# Patient Record
Sex: FEMALE | Race: BLACK OR AFRICAN AMERICAN | NOT HISPANIC OR LATINO | Employment: FULL TIME | URBAN - METROPOLITAN AREA
[De-identification: names, ages, dates, MRNs, and addresses within clinical notes are randomized per-mention and may not be internally consistent; named-entity substitution may affect disease eponyms.]

---

## 2017-09-19 ENCOUNTER — TELEPHONE (OUTPATIENT)
Dept: TRANSPLANT | Facility: CLINIC | Age: 27
End: 2017-09-19

## 2017-09-19 NOTE — TELEPHONE ENCOUNTER
"Kelly Norton called and stated that she is interested in becoming a living donor for her aunt, Marshall Roberts.  Medical and social history obtained.  Patient reports that she is 5'4", 220#, BMI 37.8. Informed that she exceeds our weight criteria and testing can not begin until she reaches 190#. Patient agreed and verbalized understanding.   "

## 2017-10-24 ENCOUNTER — HOSPITAL ENCOUNTER (EMERGENCY)
Facility: HOSPITAL | Age: 27
Discharge: HOME | End: 2017-10-24
Attending: EMERGENCY MEDICINE
Payer: COMMERCIAL

## 2017-10-24 VITALS
OXYGEN SATURATION: 100 % | WEIGHT: 200 LBS | SYSTOLIC BLOOD PRESSURE: 150 MMHG | HEART RATE: 96 BPM | TEMPERATURE: 100 F | HEIGHT: 65 IN | RESPIRATION RATE: 16 BRPM | BODY MASS INDEX: 33.32 KG/M2 | DIASTOLIC BLOOD PRESSURE: 73 MMHG

## 2017-10-24 DIAGNOSIS — M79.10 MYALGIA: ICD-10-CM

## 2017-10-24 DIAGNOSIS — J06.9 UPPER RESPIRATORY TRACT INFECTION, UNSPECIFIED TYPE: Primary | ICD-10-CM

## 2017-10-24 LAB
B-HCG UR QL: NEGATIVE
CTP QC/QA: YES
FLUAV AG SPEC QL IA: NEGATIVE
FLUBV AG SPEC QL IA: NEGATIVE
SPECIMEN SOURCE: NORMAL

## 2017-10-24 PROCEDURE — 96372 THER/PROPH/DIAG INJ SC/IM: CPT

## 2017-10-24 PROCEDURE — 87400 INFLUENZA A/B EACH AG IA: CPT

## 2017-10-24 PROCEDURE — 63600175 PHARM REV CODE 636 W HCPCS: Performed by: EMERGENCY MEDICINE

## 2017-10-24 PROCEDURE — 81025 URINE PREGNANCY TEST: CPT | Performed by: EMERGENCY MEDICINE

## 2017-10-24 PROCEDURE — 99283 EMERGENCY DEPT VISIT LOW MDM: CPT | Mod: 25

## 2017-10-24 RX ORDER — BENZONATATE 100 MG/1
100 CAPSULE ORAL 3 TIMES DAILY PRN
Qty: 14 CAPSULE | Refills: 0 | Status: SHIPPED | OUTPATIENT
Start: 2017-10-24 | End: 2019-03-14

## 2017-10-24 RX ORDER — KETOROLAC TROMETHAMINE 30 MG/ML
30 INJECTION, SOLUTION INTRAMUSCULAR; INTRAVENOUS
Status: COMPLETED | OUTPATIENT
Start: 2017-10-24 | End: 2017-10-24

## 2017-10-24 RX ORDER — ONDANSETRON 4 MG/1
4 TABLET, FILM COATED ORAL EVERY 6 HOURS PRN
Qty: 10 TABLET | Refills: 0 | Status: SHIPPED | OUTPATIENT
Start: 2017-10-24 | End: 2018-04-04

## 2017-10-24 RX ADMIN — KETOROLAC TROMETHAMINE 30 MG: 30 INJECTION, SOLUTION INTRAMUSCULAR at 07:10

## 2017-10-24 NOTE — ED PROVIDER NOTES
Encounter Date: 10/24/2017    SCRIBE #1 NOTE: I, Lucas Silverman, am scribing for, and in the presence of,  Kevin Vinson MD. I have scribed the entire note.       History     Chief Complaint   Patient presents with    Generalized Body Aches     body aches, cough with green sputum, and subjective fever since this morning     Time patient was seen by the provider: 6:44 PM    The patient is a 26 y.o. female with hx of: C- section that presents to the ED with a complaint of cough and sore throat. She reports these symptoms started this AM and have been progressively worse through the day.Denies fever/chills, N/V/D, shortness of breath or dysuria.  Patient reports persistent cough that is nonproductive.  No chest pain reported.  Denies headache, lightheadedness, dizziness, vision changes, abdominal pain, constipation, diarrhea, dysuria, hematuria.              Review of patient's allergies indicates:  No Known Allergies  History reviewed. No pertinent past medical history.  Past Surgical History:   Procedure Laterality Date     SECTION       Family History   Problem Relation Age of Onset    No Known Problems Mother     No Known Problems Father      Social History   Substance Use Topics    Smoking status: Never Smoker    Smokeless tobacco: Not on file    Alcohol use No     Review of Systems   Constitutional: Negative for chills, fatigue and fever.   HENT: Positive for congestion and sore throat. Negative for voice change.    Eyes: Negative for photophobia, pain and redness.   Respiratory: Positive for cough. Negative for choking and shortness of breath.    Cardiovascular: Negative for chest pain, palpitations and leg swelling.   Gastrointestinal: Positive for nausea. Negative for abdominal pain and vomiting.   Genitourinary: Negative for dysuria, frequency and urgency.   Musculoskeletal: Positive for myalgias. Negative for back pain, neck pain and neck stiffness.   Skin: Negative for rash.   Neurological:  Negative for seizures, speech difficulty, weakness and numbness.   Hematological: Does not bruise/bleed easily.       Physical Exam     Initial Vitals [10/24/17 1833]   BP Pulse Resp Temp SpO2   (!) 150/73 96 16 99.5 °F (37.5 °C) 100 %      MAP       98.67         Physical Exam    Nursing note and vitals reviewed.  Constitutional: She appears well-developed.   HENT:   Head: Normocephalic and atraumatic.   eythematous posterior pharynx but no exudates or swelling; Dry MM   Eyes: Conjunctivae are normal.   Neck: Normal range of motion. Neck supple. No tracheal deviation present.   Cardiovascular: Normal rate, regular rhythm, normal heart sounds and intact distal pulses. Exam reveals no gallop and no friction rub.    No murmur heard.  Pulmonary/Chest: Breath sounds normal. She has no wheezes. She has no rhonchi. She has no rales.   Abdominal: Soft. She exhibits no distension. There is no tenderness.   Musculoskeletal: Normal range of motion. She exhibits no edema or tenderness.   Neurological: She is alert and oriented to person, place, and time. She has normal strength. No cranial nerve deficit or sensory deficit.   Skin: Skin is warm and dry. No rash noted. No erythema.   Psychiatric: She has a normal mood and affect.         ED Course   Procedures  Labs Reviewed   INFLUENZA A AND B ANTIGEN   POCT URINE PREGNANCY             Medical Decision Making:   Initial Assessment:   27 Y/O male presents to the ED with a complaint of sore throat, cough, and chills.   Differential Diagnosis:   URI, Influenza, Strep pharyngitis, PTA, viral syndrome, peritonsillar abscess, retropharyngeal abscess  ED Management:  7:26 PM Re-examination:  Pt is feeling improved after Toradol. Discussed results of influenza.     Pt was instructed to increase clear fluids including Gatorade G2 or Powerade. She was instructed to take 600mg ibuprofen with food for myalgias and provided prescriptions for Tessalon and zofran. Does not meet [facility]  criteria for antibiotics for strep pharyngitis.                    ED Course      Clinical Impression:     1. Upper respiratory tract infection, unspecified type    2. Myalgia          Disposition:   Disposition: Discharged  Condition: Stable       I, Dr. Kevin Vinson, personally performed the services described in this documentation. All medical record entries made by the scribe were at my direction and in my presence.  I have reviewed the chart and agree that the record reflects my personal performance and is accurate and complete. Kevin Vinson MD.  7:34 PM 10/24/2017                     Kevin Vinson MD  10/24/17 1939

## 2019-03-14 ENCOUNTER — HOSPITAL ENCOUNTER (EMERGENCY)
Facility: HOSPITAL | Age: 29
Discharge: HOME | End: 2019-03-14
Attending: EMERGENCY MEDICINE
Payer: MEDICAID

## 2019-03-14 VITALS
DIASTOLIC BLOOD PRESSURE: 60 MMHG | OXYGEN SATURATION: 100 % | TEMPERATURE: 98 F | SYSTOLIC BLOOD PRESSURE: 136 MMHG | BODY MASS INDEX: 42.34 KG/M2 | RESPIRATION RATE: 15 BRPM | HEART RATE: 88 BPM | HEIGHT: 64 IN | WEIGHT: 248 LBS

## 2019-03-14 DIAGNOSIS — M54.50 ACUTE LEFT-SIDED LOW BACK PAIN WITHOUT SCIATICA: ICD-10-CM

## 2019-03-14 DIAGNOSIS — R10.9 LEFT SIDED ABDOMINAL PAIN: Primary | ICD-10-CM

## 2019-03-14 LAB
ALBUMIN SERPL BCP-MCNC: 3.1 G/DL
ALP SERPL-CCNC: 92 U/L
ALT SERPL W/O P-5'-P-CCNC: 12 U/L
ANION GAP SERPL CALC-SCNC: 8 MMOL/L
AST SERPL-CCNC: 13 U/L
B-HCG UR QL: POSITIVE
BASOPHILS # BLD AUTO: 0.01 K/UL
BASOPHILS NFR BLD: 0.2 %
BILIRUB SERPL-MCNC: 0.2 MG/DL
BILIRUB UR QL STRIP: NEGATIVE
BUN SERPL-MCNC: 5 MG/DL
CALCIUM SERPL-MCNC: 9.3 MG/DL
CHLORIDE SERPL-SCNC: 107 MMOL/L
CLARITY UR: CLEAR
CO2 SERPL-SCNC: 22 MMOL/L
COLOR UR: YELLOW
CREAT SERPL-MCNC: 0.7 MG/DL
CTP QC/QA: YES
DIFFERENTIAL METHOD: ABNORMAL
EOSINOPHIL # BLD AUTO: 0.1 K/UL
EOSINOPHIL NFR BLD: 1.8 %
ERYTHROCYTE [DISTWIDTH] IN BLOOD BY AUTOMATED COUNT: 13 %
EST. GFR  (AFRICAN AMERICAN): >60 ML/MIN/1.73 M^2
EST. GFR  (NON AFRICAN AMERICAN): >60 ML/MIN/1.73 M^2
GLUCOSE SERPL-MCNC: 107 MG/DL
GLUCOSE UR QL STRIP: NEGATIVE
HCT VFR BLD AUTO: 36.2 %
HGB BLD-MCNC: 11.9 G/DL
HGB UR QL STRIP: NEGATIVE
KETONES UR QL STRIP: NEGATIVE
LEUKOCYTE ESTERASE UR QL STRIP: NEGATIVE
LIPASE SERPL-CCNC: 8 U/L
LYMPHOCYTES # BLD AUTO: 1.2 K/UL
LYMPHOCYTES NFR BLD: 24.6 %
MCH RBC QN AUTO: 27.5 PG
MCHC RBC AUTO-ENTMCNC: 32.9 G/DL
MCV RBC AUTO: 84 FL
MONOCYTES # BLD AUTO: 0.5 K/UL
MONOCYTES NFR BLD: 9.4 %
NEUTROPHILS # BLD AUTO: 3.2 K/UL
NEUTROPHILS NFR BLD: 63.8 %
NITRITE UR QL STRIP: NEGATIVE
PH UR STRIP: 7 [PH] (ref 5–8)
PLATELET # BLD AUTO: 182 K/UL
PMV BLD AUTO: 12.5 FL
POTASSIUM SERPL-SCNC: 3.8 MMOL/L
PROT SERPL-MCNC: 7.2 G/DL
PROT UR QL STRIP: NEGATIVE
RBC # BLD AUTO: 4.32 M/UL
SODIUM SERPL-SCNC: 137 MMOL/L
SP GR UR STRIP: 1.02 (ref 1–1.03)
URN SPEC COLLECT METH UR: NORMAL
UROBILINOGEN UR STRIP-ACNC: NEGATIVE EU/DL
WBC # BLD AUTO: 4.99 K/UL

## 2019-03-14 PROCEDURE — 25000003 PHARM REV CODE 250: Performed by: NURSE PRACTITIONER

## 2019-03-14 PROCEDURE — 80053 COMPREHEN METABOLIC PANEL: CPT

## 2019-03-14 PROCEDURE — 85025 COMPLETE CBC W/AUTO DIFF WBC: CPT

## 2019-03-14 PROCEDURE — 99284 EMERGENCY DEPT VISIT MOD MDM: CPT | Mod: 25

## 2019-03-14 PROCEDURE — 96360 HYDRATION IV INFUSION INIT: CPT

## 2019-03-14 PROCEDURE — 81025 URINE PREGNANCY TEST: CPT | Performed by: PHYSICIAN ASSISTANT

## 2019-03-14 PROCEDURE — 81003 URINALYSIS AUTO W/O SCOPE: CPT

## 2019-03-14 PROCEDURE — 83690 ASSAY OF LIPASE: CPT

## 2019-03-14 RX ORDER — ACETAMINOPHEN 325 MG/1
650 TABLET ORAL
Status: COMPLETED | OUTPATIENT
Start: 2019-03-14 | End: 2019-03-14

## 2019-03-14 RX ADMIN — SODIUM CHLORIDE 1000 ML: 0.9 INJECTION, SOLUTION INTRAVENOUS at 03:03

## 2019-03-14 RX ADMIN — ACETAMINOPHEN 650 MG: 325 TABLET ORAL at 05:03

## 2019-03-14 NOTE — ED NOTES
APPEARANCE: Alert, oriented and in no acute distress.  CARDIAC: Normal rate and rhythm, no murmur heard.   PERIPHERAL VASCULAR: peripheral pulses present. Normal cap refill. No edema. Warm to touch.    RESPIRATORY:Normal rate and effort, breath sounds clear bilaterally throughout chest. Respirations are equal and unlabored no obvious signs of distress.  GASTRO: soft, bowel sounds normal, no tenderness, no abdominal distention.  MUSC: Full ROM. No bony tenderness or soft tissue tenderness. No obvious deformity.  SKIN: Skin is warm and dry, normal skin turgor, mucous membranes moist.  NEURO: 5/5 strength major flexors/extensors bilaterally. Sensory intact to light touch bilaterally. Laurel coma scale: eyes open spontaneously-4, oriented & converses-5, obeys commands-6. No neurological abnormalities.   MENTAL STATUS: awake, alert and aware of environment.  EYE: PERRL, both eyes: pupils brisk and reactive to light. Normal size.  ENT: EARS: no obvious drainage. NOSE: no active bleeding.

## 2019-03-14 NOTE — ED PROVIDER NOTES
Encounter Date: 3/14/2019       History     Chief Complaint   Patient presents with    Back Pain     c/o left lower back pain that radiates up to left abdomen today. Pt is 10 weeks pregnant. Sees Dr. Devine.      27yo female  currently 10 weeks pregnant presents to the ED for abdominal and back pain which started today.  The patient reports the pain starts in her left flank and goes to her left upper and lower quadrants.  Pain is worse with movement palpation. Pain is constant, moderate. She denies trauma, fever/chills, chest pain, shortness of breath, vomiting, diarrhea, constipation, dysuria, vaginal bleeding, and discharge. She took Tylenol for the pain which did mildly help but the pain came back after a few hours.  The patient is established with  and has had an US to establish IUP.  She reports this pregnancy is healthy.  She has been eating and drinking well. No other complaints at this time.       The history is provided by the patient and medical records.     Review of patient's allergies indicates:  No Known Allergies  Past Medical History:   Diagnosis Date    Asthma      Past Surgical History:   Procedure Laterality Date     SECTION       Family History   Problem Relation Age of Onset    No Known Problems Mother     No Known Problems Father      Social History     Tobacco Use    Smoking status: Former Smoker     Types: Cigarettes     Last attempt to quit: 2018     Years since quittin.5    Smokeless tobacco: Never Used   Substance Use Topics    Alcohol use: No    Drug use: No     Review of Systems   Constitutional: Negative for activity change, appetite change, chills, fatigue and fever.   HENT: Negative for congestion.    Respiratory: Negative for cough and shortness of breath.    Cardiovascular: Negative for chest pain.   Gastrointestinal: Positive for abdominal pain. Negative for diarrhea, nausea and vomiting.   Genitourinary: Positive for flank pain (left).  Negative for dysuria, hematuria, vaginal bleeding and vaginal discharge.   Musculoskeletal: Positive for back pain.   Skin: Negative for rash.   Neurological: Negative for weakness and headaches.   Psychiatric/Behavioral: Negative for confusion.   All other systems reviewed and are negative.      Physical Exam     Initial Vitals [03/14/19 1409]   BP Pulse Resp Temp SpO2   -- 92 18 98.8 °F (37.1 °C) 95 %      MAP       --         Physical Exam    Nursing note and vitals reviewed.  Constitutional: Vital signs are normal. She appears well-developed and well-nourished. She is Obese . She is active and cooperative. She is easily aroused.  Non-toxic appearance. She does not have a sickly appearance. She does not appear ill. No distress.   HENT:   Head: Normocephalic and atraumatic.   Eyes: Conjunctivae and EOM are normal.   Neck: Normal range of motion. Neck supple.   Cardiovascular: Normal rate, regular rhythm and normal heart sounds.   Pulmonary/Chest: Effort normal and breath sounds normal.   Abdominal: Soft. Normal appearance and bowel sounds are normal. There is tenderness in the left upper quadrant and left lower quadrant. There is no rigidity, no rebound, no guarding and no CVA tenderness.   Neurological: She is alert, oriented to person, place, and time and easily aroused. She has normal strength. GCS eye subscore is 4. GCS verbal subscore is 5. GCS motor subscore is 6.   Skin: Skin is warm, dry and intact. No bruising and no rash noted. No erythema.   Psychiatric: She has a normal mood and affect. Her speech is normal and behavior is normal. Judgment and thought content normal. Cognition and memory are normal.         ED Course   Procedures  Labs Reviewed   CBC W/ AUTO DIFFERENTIAL - Abnormal; Notable for the following components:       Result Value    Hemoglobin 11.9 (*)     Hematocrit 36.2 (*)     All other components within normal limits   COMPREHENSIVE METABOLIC PANEL - Abnormal; Notable for the following  components:    CO2 22 (*)     BUN, Bld 5 (*)     Albumin 3.1 (*)     All other components within normal limits   POCT URINE PREGNANCY - Abnormal; Notable for the following components:    POC Preg Test, Ur Positive (*)     All other components within normal limits   URINALYSIS, REFLEX TO URINE CULTURE    Narrative:     Preferred Collection Type->Urine, Clean Catch   LIPASE          Imaging Results          US OB Less Than 14 Wks with Transvaginal (xpd) (Final result)  Result time 03/14/19 15:58:07   Procedure changed from US Pelvis Comp with Transvag NON-OB (xpd     Final result by Mason Vargas MD (03/14/19 15:58:07)                 Impression:      Living intrauterine twin pregnancy, likely monochorionic/diamniotic, as above.    Twin A: 11 weeks and 0 days with an estimated due date of 10/03/2019.    Twin B: 10 weeks and 4 days with an estimated due date of 10/06/2019.      Electronically signed by: Mason Vargas MD  Date:    03/14/2019  Time:    15:58             Narrative:    EXAMINATION:  US OB LESS THAN 14 WKS WITH TRANSVAGINAL (XPD)    CLINICAL HISTORY:  left pelvic pain.  r/o torsion.;    TECHNIQUE:  Transabdominal sonography of the pelvis was performed, followed by transvaginal sonography to better evaluate the uterus and ovaries.    COMPARISON:  None.    FINDINGS:  Intrauterine gestation(s): Twins,  by a thin membrane.  Placenta appears posterior and shared.    Twin A:    Yolk sac: Not seen.    Crown-rump length (CRL): 4 cm    Cardiac activity: 161 BPM    Twin B:    Yolk sac: Not seen.    Crown-rump length (CRL): 3.7 cm    Cardiac activity: 165 BPM    Uterus: 12.6 x 6 x 7.6 cm    Right ovary: Normal.    Left ovary: Normal.    Miscellaneous: No Free Fluid.  No subchorionic hematoma seen.                                 Medical Decision Making:   Differential Diagnosis:   Dehydration, infection,GERD, gas pain, electrolyte derangement, pancreatitis, cholecystitis, ovarian torsion, ovarian  cyst    Clinical Tests:   Lab Tests: Ordered and Reviewed  Radiological Study: Ordered and Reviewed  ED Management:  Labs, iv fluids, US pelvis, PO tylenol   US reveals viable twin pregnancy.  Ovaries normal bilaterally.  Labs unremarkable. UA negative for infection. Pt denies vaginal bleeding.  Pt reports complete resolution of back and abd pain after IV fluids and tylenol.  She is tolerating po fluids without difficulty and is requesting discharge.  Encouraged increased fluid intake and use of otc tylenol as directed on the labeling.  Pt to follow up with OB within 2 days.  I feel the pt's abd pain is benign.  I reviewed strict return precautions. In addition, pt is to return to the ED if condition changes, progresses, or if there are any concerns.  Pt verbalized understanding, compliance, and agreement with the treatment plan.  Pt states that she feels comfortable with DC at this time.                 Attending Attestation:     Physician Attestation Statement for NP/PA:   I discussed this assessment and plan of this patient with the NP/PA, but I did not personally examine the patient. The face to face encounter was performed by the NP/PA.                  ED Course as of Mar 14 1445   Thu Mar 14, 2019   1417 Sort note: Kelly Norton nontoxic/afebrile 28 y.o.  presented to the ED with c/o left lower and right lower back pain that began today.  Patient denies any trauma. Patient is currently 10 weeks pregnant followed by Dr. Colon.  She denies any GI or  symptoms     Patient seen and medically screened by Physician assistant in Sort process due to ED crowding.  Appropriate tests and/or medications ordered.  Care transferred to an alternate provider when patient was placed in an Exam Room from the [facility] for physical exam, additional orders, and disposition. 2:17 PM. LC    [LC]      ED Course User Index  [LC] RAMBO Purdy     Clinical Impression:       ICD-10-CM ICD-9-CM   1. Left sided abdominal pain  R10.9 789.09   2. Acute left-sided low back pain without sciatica M54.5 724.2                                TANO Crawford  03/14/19 1724       TANO Crawford  03/14/19 1724       Natan Victor MD  03/14/19 1729

## 2019-03-14 NOTE — DISCHARGE INSTRUCTIONS
Increase your water intake. Return to the ED if your condition changes, progresses, or if you have any concerns.

## 2019-04-20 ENCOUNTER — HOSPITAL ENCOUNTER (EMERGENCY)
Facility: HOSPITAL | Age: 29
Discharge: HOME | End: 2019-04-21
Attending: EMERGENCY MEDICINE
Payer: MEDICAID

## 2019-04-20 DIAGNOSIS — R10.9 ABDOMINAL PAIN IN PREGNANCY: ICD-10-CM

## 2019-04-20 DIAGNOSIS — O26.899 ABDOMINAL PAIN IN PREGNANCY: ICD-10-CM

## 2019-04-20 LAB
ALBUMIN SERPL BCP-MCNC: 2.8 G/DL (ref 3.5–5.2)
ALP SERPL-CCNC: 93 U/L (ref 55–135)
ALT SERPL W/O P-5'-P-CCNC: 16 U/L (ref 10–44)
ANION GAP SERPL CALC-SCNC: 6 MMOL/L (ref 8–16)
AST SERPL-CCNC: 14 U/L (ref 10–40)
BASOPHILS # BLD AUTO: 0.02 K/UL (ref 0–0.2)
BASOPHILS NFR BLD: 0.3 % (ref 0–1.9)
BILIRUB SERPL-MCNC: 0.1 MG/DL (ref 0.1–1)
BILIRUB UR QL STRIP: NEGATIVE
BUN SERPL-MCNC: 5 MG/DL (ref 6–20)
CALCIUM SERPL-MCNC: 8.6 MG/DL (ref 8.7–10.5)
CHLORIDE SERPL-SCNC: 108 MMOL/L (ref 95–110)
CLARITY UR: CLEAR
CO2 SERPL-SCNC: 21 MMOL/L (ref 23–29)
COLOR UR: YELLOW
CREAT SERPL-MCNC: 0.7 MG/DL (ref 0.5–1.4)
DIFFERENTIAL METHOD: ABNORMAL
EOSINOPHIL # BLD AUTO: 0.2 K/UL (ref 0–0.5)
EOSINOPHIL NFR BLD: 2.4 % (ref 0–8)
ERYTHROCYTE [DISTWIDTH] IN BLOOD BY AUTOMATED COUNT: 13.6 % (ref 11.5–14.5)
EST. GFR  (AFRICAN AMERICAN): >60 ML/MIN/1.73 M^2
EST. GFR  (NON AFRICAN AMERICAN): >60 ML/MIN/1.73 M^2
GLUCOSE SERPL-MCNC: 112 MG/DL (ref 70–110)
GLUCOSE UR QL STRIP: NEGATIVE
HCG INTACT+B SERPL-ACNC: NORMAL MIU/ML
HCT VFR BLD AUTO: 34 % (ref 37–48.5)
HGB BLD-MCNC: 11.2 G/DL (ref 12–16)
HGB UR QL STRIP: NEGATIVE
KETONES UR QL STRIP: NEGATIVE
LEUKOCYTE ESTERASE UR QL STRIP: NEGATIVE
LIPASE SERPL-CCNC: 11 U/L (ref 4–60)
LYMPHOCYTES # BLD AUTO: 1.4 K/UL (ref 1–4.8)
LYMPHOCYTES NFR BLD: 21.9 % (ref 18–48)
MCH RBC QN AUTO: 27.9 PG (ref 27–31)
MCHC RBC AUTO-ENTMCNC: 32.9 G/DL (ref 32–36)
MCV RBC AUTO: 85 FL (ref 82–98)
MONOCYTES # BLD AUTO: 0.5 K/UL (ref 0.3–1)
MONOCYTES NFR BLD: 8.4 % (ref 4–15)
NEUTROPHILS # BLD AUTO: 4.1 K/UL (ref 1.8–7.7)
NEUTROPHILS NFR BLD: 66.7 % (ref 38–73)
NITRITE UR QL STRIP: NEGATIVE
PH UR STRIP: 7 [PH] (ref 5–8)
PLATELET # BLD AUTO: 164 K/UL (ref 150–350)
PMV BLD AUTO: 12.5 FL (ref 9.2–12.9)
POTASSIUM SERPL-SCNC: 3.5 MMOL/L (ref 3.5–5.1)
PROT SERPL-MCNC: 6.6 G/DL (ref 6–8.4)
PROT UR QL STRIP: NEGATIVE
RBC # BLD AUTO: 4.01 M/UL (ref 4–5.4)
SODIUM SERPL-SCNC: 135 MMOL/L (ref 136–145)
SP GR UR STRIP: 1.01 (ref 1–1.03)
URN SPEC COLLECT METH UR: NORMAL
UROBILINOGEN UR STRIP-ACNC: NEGATIVE EU/DL
WBC # BLD AUTO: 6.16 K/UL (ref 3.9–12.7)

## 2019-04-20 PROCEDURE — 25000003 PHARM REV CODE 250: Performed by: EMERGENCY MEDICINE

## 2019-04-20 PROCEDURE — 84702 CHORIONIC GONADOTROPIN TEST: CPT

## 2019-04-20 PROCEDURE — 99284 EMERGENCY DEPT VISIT MOD MDM: CPT | Mod: 25

## 2019-04-20 PROCEDURE — 83690 ASSAY OF LIPASE: CPT

## 2019-04-20 PROCEDURE — 80053 COMPREHEN METABOLIC PANEL: CPT

## 2019-04-20 PROCEDURE — 81003 URINALYSIS AUTO W/O SCOPE: CPT

## 2019-04-20 PROCEDURE — 85025 COMPLETE CBC W/AUTO DIFF WBC: CPT

## 2019-04-20 RX ORDER — ACETAMINOPHEN 500 MG
1000 TABLET ORAL
Status: COMPLETED | OUTPATIENT
Start: 2019-04-20 | End: 2019-04-20

## 2019-04-20 RX ADMIN — ACETAMINOPHEN 1000 MG: 500 TABLET ORAL at 09:04

## 2019-04-21 VITALS
WEIGHT: 249 LBS | BODY MASS INDEX: 42.51 KG/M2 | HEIGHT: 64 IN | RESPIRATION RATE: 14 BRPM | DIASTOLIC BLOOD PRESSURE: 60 MMHG | SYSTOLIC BLOOD PRESSURE: 125 MMHG | OXYGEN SATURATION: 97 % | TEMPERATURE: 99 F | HEART RATE: 82 BPM

## 2019-04-21 RX ORDER — ACETAMINOPHEN 500 MG
500 TABLET ORAL EVERY 6 HOURS PRN
Qty: 28 TABLET | Refills: 0 | Status: SHIPPED | OUTPATIENT
Start: 2019-04-21

## 2019-04-21 NOTE — ED NOTES
Patient states she is a patient of Dr. Devine. This is her third pregnancy she has two living children at home. States she is 15 weeks with twins. Denies vaginal bleeding or discharge at this time. Patient only complains of pressure and pain in her lower abdomen at this time that got worse throughout the day today.

## 2019-04-21 NOTE — ED PROVIDER NOTES
Encounter Date: 2019    SCRIBE #1 NOTE: I, Sahil Grant, am scribing for, and in the presence of,  Dr. Lynn. I have scribed the entire note.       History     Chief Complaint   Patient presents with    Abdominal Pain     PT C/O LOWER ABDOMINAL/PELVIC PAIN AND PRESSURE ONSET THIS MORNING. PT REPORTS SHE IS 15 WEEKS PREGNANT. DENIES VAGINAL BLEEDING AND DISCHARGE.     Kelly Norton is a 28 y.o. female who  has a past medical history of Asthma.    The patient presents to the ED due to Abdominal Pain.   Patient reports that starting this morning she has had intermittent pain and pressure to the lower abdomen. Pt is 15 weeks pregnant . She notes that she has had similar episodes in the past, but not of this intensity. She reports that she is overall healthy and denies any dysuria, hematuria, vaginal discharge, CP, SOB, or N/V/D. She denies the daily use of any medications. SHx of 2 C-Sections. Pt notes her last menstrual was 19. She sees Dr. Devine. She is currently carrying twins. She denies complications thus far with the pregnancy. She works as a  and noted her pain was worse while walking.         The history is provided by the patient.     Review of patient's allergies indicates:  No Known Allergies  Past Medical History:   Diagnosis Date    Asthma      Past Surgical History:   Procedure Laterality Date     SECTION       Family History   Problem Relation Age of Onset    No Known Problems Mother     No Known Problems Father      Social History     Tobacco Use    Smoking status: Former Smoker     Types: Cigarettes     Last attempt to quit: 2018     Years since quittin.6    Smokeless tobacco: Never Used   Substance Use Topics    Alcohol use: No    Drug use: No     Review of Systems   Constitutional: Negative for chills and fever.   HENT: Negative for sore throat.    Respiratory: Negative for cough and shortness of breath.    Cardiovascular: Negative for chest  pain.   Gastrointestinal: Positive for abdominal pain. Negative for nausea and vomiting.   Genitourinary: Negative for dysuria, frequency and urgency.   Musculoskeletal: Negative for back pain, neck pain and neck stiffness.   Skin: Negative for rash and wound.   Neurological: Negative for syncope and weakness.   Hematological: Does not bruise/bleed easily.   Psychiatric/Behavioral: Negative for agitation, behavioral problems and confusion.       Physical Exam     Initial Vitals [04/20/19 2130]   BP Pulse Resp Temp SpO2   (!) 130/56 78 18 98.4 °F (36.9 °C) 99 %      MAP       --         Physical Exam    Nursing note and vitals reviewed.  Constitutional: She appears well-developed and well-nourished. She is not diaphoretic. No distress.   HENT:   Head: Normocephalic and atraumatic.   Mouth/Throat: Oropharynx is clear and moist.   Eyes: EOM are normal. Pupils are equal, round, and reactive to light.   Neck: No tracheal deviation present.   Cardiovascular: Normal rate, regular rhythm, normal heart sounds and intact distal pulses.   Pulmonary/Chest: Breath sounds normal. No stridor. No respiratory distress. She has no wheezes.   Abdominal: Soft. Bowel sounds are normal. She exhibits no distension and no mass. There is tenderness. There is no rebound, no guarding and negative Ramirez's sign.   Diffuse lower abdominal tenderness   Musculoskeletal: Normal range of motion. She exhibits no edema.   Neurological: She is alert and oriented to person, place, and time. No cranial nerve deficit or sensory deficit.   Skin: Skin is warm and dry. Capillary refill takes less than 2 seconds. No rash noted.         ED Course   Procedures  Labs Reviewed   COMPREHENSIVE METABOLIC PANEL - Abnormal; Notable for the following components:       Result Value    Sodium 135 (*)     CO2 21 (*)     Glucose 112 (*)     BUN, Bld 5 (*)     Calcium 8.6 (*)     Albumin 2.8 (*)     Anion Gap 6 (*)     All other components within normal limits   CBC W/  AUTO DIFFERENTIAL - Abnormal; Notable for the following components:    Hemoglobin 11.2 (*)     Hematocrit 34.0 (*)     All other components within normal limits   LIPASE   HCG, QUANTITATIVE, PREGNANCY   URINALYSIS, REFLEX TO URINE CULTURE    Narrative:     Preferred Collection Type->Urine, Clean Catch          Imaging Results          US OB Limited 1 Or More Gestations (Final result)  Result time 04/20/19 23:47:10    Final result by Lexii Zaldivar MD (04/20/19 23:47:10)                 Impression:      Live intrauterine diamniotic diamniotic twin gestation.  Twin A in cephalic presentation with a composite gestational age by ultrasound of 16 weeks and an estimated due date of 10/05/2019.  Estimated fetal weight of 130 g or 5 ounces.  Adequate amniotic fluid.    Twin B in cephalic presentation with a composite gestational age by ultrasound of 16 weeks 1 day and an estimated due date of 10/04/2019.  Estimated fetal weight of 135 g +/-20 g.  Adequate amniotic fluid.      Electronically signed by: Lexii Zaldivar  Date:    04/20/2019  Time:    23:47             Narrative:    EXAMINATION:  ULTRASOUND OB LIMITED ONE OR MORE GESTATIONS    CLINICAL HISTORY:  Abdominal Pain and pregnancy.    TECHNIQUE:  Real-time ultrasound OB limited was performed.    COMPARISON:  03/14/2019    FINDINGS:  There is a live diamniotic diamniotic twin intrauterine gestation in cephalic  presentation. Placenta is posterior.  There is no placenta previa or placental abruption. The cervix measures 4.3 cm in length.    Fetus A in cephalic presentation.    Fetus A MEASUREMENTS:    Fetal heart rate: 144 beats per minute.    Biparietal diameter: 3.4 cm. Sixteen weeks 4 days.    Head circumference: 11.7 cm. Fifteen weeks 6 days.    Abdominal circumference: 9.8 cm. Sixteen weeks.    Femoral length: 1.8 cm. Fifteen weeks 3 days.    Composite age by ultrasound: 16 week.    Estimated fetal weight: 130+/- 19 grams or 5 ounces +/-1 ounce(s).    Amniotic  fluid: Appears normal.  Main vertical pocket of 4.8 cm.    LMP percentile: Normal at 44%    Fetus B in cephalic presentation.    Fetus B MEASUREMENTS:    Fetal heart rate: 143 beats per minute.    Biparietal diameter: 3.4 cm. Sixteen weeks 4 days.    Head circumference: 12.4 cm. Sixteen weeks 2 days.    Abdominal circumference: 10.1 cm. Sixteen weeks 1 day.    Femoral length: 1.8 cm. Fifteen weeks 3 days.    Composite age by ultrasound: 16 week 1 day.    Estimated fetal weight: 135+/- 20 grams or 5 ounces +/-1 ounce(s).    Amniotic fluid: Appears normal.Amniotic fluid: Appears normal. Main vertical pocket of 4.3 cm.    LMP percentile: Normal at 56%                                 Medical Decision Making:   Differential Diagnosis:   Differential Diagnosis includes, but is not limited to:  AAA, aortic dissection, mesenteric ischemia, perforated viscous, MI/ACS, SBO/volvulus, incarcerated/strangulated hernia, intussusception, ileus, appendicitis, cholecystitis, cholangitis, diverticulitis, esophagitis, hepatitis, nephrolithiasis, pancreatitis, gastroenteritis, colitis, IBD/IBS, biliary colic, GERD, PUD, constipation, UTI/pyelonephritis,  disorder, vaginal bleeding, miscarriage     ED Management:  Labs US    29 yo female with intermittent lower abdominal pain that was worse today. Her labs are within normal limits. TVUS shows  +FHR. She was observed and given acetaminophen with improvement of her pain. Patient advised to follow up with OB GYN in 2-4 days.    Return precautions for worsening abdominal pain, fever, vomiting vaginal bleeding or any other concern.     After complete evaluation, including thorough history and physical exam, the patient's symptoms are most consistent with benign cause of abdominal pain. There is no rebound/guarding or other peritoneal signs to suggest perforation or other emergent surgical process. There is no fever or leukocytosis to suggest acute bacterial infection. There is no  significant focal abdominal tenderness to suggest cholecystitis, appendicitis, diverticulitis, or  source, and the patient's current symptoms and clinical presentation do not warrant other targeted diagnostics at this time.Advanced imaging  unlikely to outweigh risks of contrast/radiation at this time. The patient was treated with supportive care  and improved. Will provide RX for acetaminphen upon D/C.                        Clinical Impression:     1. Abdominal pain in pregnancy      Disposition:   Disposition: Discharged  Condition: Stable    Scribe Attestation I, Dru Lynn,  personally performed the services described in this documentation. All medical record entries made by the scribe were at my direction and in my presence.  I have reviewed the chart and agree that the record reflects my personal performance and is accurate and complete. Dru Lynn M.D. 2:45 AM04/21/2019                      Dru Lynn Jr., MD  04/21/19 0245

## 2019-04-21 NOTE — ED NOTES
Patient updated on ultrasound and time it will take to receive results at this time. Patient verbalized understanding.

## 2019-07-20 ENCOUNTER — HOSPITAL ENCOUNTER (OUTPATIENT)
Facility: HOSPITAL | Age: 29
Discharge: HOME | End: 2019-07-20
Attending: OBSTETRICS & GYNECOLOGY | Admitting: OBSTETRICS & GYNECOLOGY
Payer: MEDICAID

## 2019-07-20 VITALS
WEIGHT: 260 LBS | SYSTOLIC BLOOD PRESSURE: 145 MMHG | DIASTOLIC BLOOD PRESSURE: 69 MMHG | HEART RATE: 83 BPM | HEIGHT: 65 IN | OXYGEN SATURATION: 100 % | BODY MASS INDEX: 43.32 KG/M2 | TEMPERATURE: 99 F | RESPIRATION RATE: 16 BRPM

## 2019-07-20 PROCEDURE — 59025 FETAL NON-STRESS TEST: CPT

## 2019-07-20 PROCEDURE — 99211 OFF/OP EST MAY X REQ PHY/QHP: CPT | Mod: 25

## 2019-07-21 NOTE — NURSING
"Pt is a 29 y/o , with twins, at 28.4 weeks. Hx c/s x 2. Med hx asthma, states she has not had an attack for years. C/o ctxs started last week. States she had ctxs before she went to work at 6am, had no ctxs at work, ctxs started again after work. Unable to say how often the ctxs are, but the last one was  " in the car". Placed on monitors, no ctxs  noted or reported. Denies rom, vag bleed. + movement. States she had 2 bottles of water today. Given a pitcher of water for po hydration. Pt also states her dr told her she is " on the verge of diabetic".    cervix closed per sve.  "

## 2019-07-21 NOTE — NURSING
Dr perdomo paged again. Notified of pt arrival and status, results of assessment. Will d/c home on ptl prec and f/u with reg appt. Pt given printed and verbal instructions

## 2019-07-21 NOTE — DISCHARGE INSTRUCTIONS
ONE GLASS OF WATER EVERY 2 HOURS WHILE AWAKE. TYLENOL FOR PAIN. Continue meds as before. Follow up with all reg appointments. Return for vaginal bleeding, contractions every 5-7 minutes for an hour, waterbag breaking or babies not moving in 12 hours. Call for any questions

## 2019-08-11 ENCOUNTER — HOSPITAL ENCOUNTER (EMERGENCY)
Facility: HOSPITAL | Age: 29
Discharge: HOME | End: 2019-08-11
Attending: EMERGENCY MEDICINE
Payer: MEDICAID

## 2019-08-11 ENCOUNTER — HOSPITAL ENCOUNTER (OUTPATIENT)
Facility: HOSPITAL | Age: 29
Discharge: HOME | End: 2019-08-11
Attending: OBSTETRICS & GYNECOLOGY | Admitting: OBSTETRICS & GYNECOLOGY
Payer: MEDICAID

## 2019-08-11 VITALS
HEIGHT: 65 IN | TEMPERATURE: 98 F | OXYGEN SATURATION: 99 % | HEART RATE: 95 BPM | SYSTOLIC BLOOD PRESSURE: 100 MMHG | BODY MASS INDEX: 43.32 KG/M2 | WEIGHT: 260 LBS | DIASTOLIC BLOOD PRESSURE: 69 MMHG | RESPIRATION RATE: 20 BRPM

## 2019-08-11 VITALS
SYSTOLIC BLOOD PRESSURE: 133 MMHG | HEART RATE: 105 BPM | DIASTOLIC BLOOD PRESSURE: 63 MMHG | TEMPERATURE: 98 F | RESPIRATION RATE: 18 BRPM | OXYGEN SATURATION: 99 %

## 2019-08-11 DIAGNOSIS — R10.9 ABDOMINAL PAIN AFFECTING PREGNANCY: ICD-10-CM

## 2019-08-11 DIAGNOSIS — O26.899 ABDOMINAL PAIN AFFECTING PREGNANCY: ICD-10-CM

## 2019-08-11 DIAGNOSIS — L02.412 ABSCESS OF AXILLA, LEFT: Primary | ICD-10-CM

## 2019-08-11 DIAGNOSIS — L02.31 ABSCESS OF BUTTOCK: ICD-10-CM

## 2019-08-11 PROCEDURE — 10061 I&D ABSCESS COMP/MULTIPLE: CPT

## 2019-08-11 PROCEDURE — G0378 HOSPITAL OBSERVATION PER HR: HCPCS

## 2019-08-11 PROCEDURE — 25000003 PHARM REV CODE 250: Performed by: EMERGENCY MEDICINE

## 2019-08-11 PROCEDURE — 25000003 PHARM REV CODE 250: Performed by: NURSE PRACTITIONER

## 2019-08-11 PROCEDURE — 99211 OFF/OP EST MAY X REQ PHY/QHP: CPT

## 2019-08-11 PROCEDURE — 99284 EMERGENCY DEPT VISIT MOD MDM: CPT | Mod: 25,27

## 2019-08-11 RX ORDER — LIDOCAINE HYDROCHLORIDE 10 MG/ML
10 INJECTION INFILTRATION; PERINEURAL
Status: COMPLETED | OUTPATIENT
Start: 2019-08-11 | End: 2019-08-11

## 2019-08-11 RX ORDER — HYDROCODONE BITARTRATE AND ACETAMINOPHEN 5; 325 MG/1; MG/1
1 TABLET ORAL EVERY 6 HOURS PRN
Qty: 110 TABLET | Refills: 0 | Status: SHIPPED | OUTPATIENT
Start: 2019-08-11

## 2019-08-11 RX ORDER — CEPHALEXIN 500 MG/1
500 CAPSULE ORAL 4 TIMES DAILY
Qty: 28 CAPSULE | Refills: 0 | Status: SHIPPED | OUTPATIENT
Start: 2019-08-11 | End: 2019-08-18

## 2019-08-11 RX ORDER — ACETAMINOPHEN 500 MG
1000 TABLET ORAL
Status: COMPLETED | OUTPATIENT
Start: 2019-08-11 | End: 2019-08-11

## 2019-08-11 RX ADMIN — ACETAMINOPHEN 1000 MG: 500 TABLET ORAL at 08:08

## 2019-08-11 RX ADMIN — LIDOCAINE HYDROCHLORIDE 10 ML: 10 INJECTION, SOLUTION INFILTRATION; PERINEURAL at 07:08

## 2019-08-11 NOTE — PLAN OF CARE
pt arrived on unit from er via wheelchair, gowned and efm applied, pt complains of mild ctx but states the reason she is here is because she has two abcesses that need to be lanced.  One in her inner buttox and one under her left breast.  Pt denies vag bledding and leaking fluid, confirms +FM.

## 2019-08-11 NOTE — ED NOTES
Fetal heart tones accessed Child 1: 166                                               Child 2: 147

## 2019-08-11 NOTE — DISCHARGE INSTRUCTIONS
Follow Labor Precautions:  Call MD or return to Labor and Delivery if...    Labor (after 36 weeks)  - Painful contractions every 5 minutes for 2 hours that do not go away with 2 bottles of water, 2 tylenol, and rest.      Labor (before 36 weeks)  - More than 4 contractions in 1 hour   -First try 2 bottles of water, rest, and 2 tylenol.... If the contractions do not go away call doctors office or after hours clinic first and/or come to hospital for evaluation.      Water Breaks  - Gush or leaking of fluid from vagina, or if unsure.     Vaginal bleeding  - Bright red bleeding like a period, soaking a pad in 1 hour.    Decreased or No fetal movement  - You should feel 10 movements within two hours.  -If you are not feeling the baby move.... Drink a glass of orange juice or apple juice  - If you DO NOT feel 10 movements within two hours, please  call the MD or come to the hospital.    Unable to keep fluids or food down for more than 24 hours    Blurred vision, spots before your eyes, dizziness, headache that does not get better with Tylenol (Acetaminophen), bad swelling, chest pain, or trouble breathing.    Drink 10-12 glasses of water daily  Take medications as prescribed  Keep all follow-up appointments    LABOR     What is  labor?   labor is labor that occurs before 37 completed weeks of pregnancy.  Your baby could be born to early and have serious health problems.    Will you have  labor?   labor can happen to any woman, but some women are at greater risk than others.  A woman is more likely to have  labor if she:  · Is pregnant with twins or more  · Had a premature birth (before 37 completed weeks of pregnancy), in another pregnancy  · Has certain problems of the uterus or cervix    Call your health care provider or go to the hospital right away if you have any of these warning signs:  · Contractions that make your belly tighten up like a fist every 10 minutes or  more often  · Change in the color of your vaginal discharge, or bleeding from your vagina  · The feeling that your baby is pushing down.  This is called pelvic pressure  · Low, dull backache  · Cramps that feel like your period  · Belly cramps with or without diarrhea      Home Care:   It can help to drink plenty of water and take warm baths. Do what you can ahead of time to prepare for giving birth so youll have less to worry about later.   Keep a record of your contractions. Write down what time each one starts and how long it lasts. A stopwatch is helpful. Look for the pattern of regularly spaced out contractions with a gradual increase in the time each one lasts.   Dont be embarrassed about going to the hospital with a false alarm.     More about contractions    · When any muscle in your body contracts, it becomes tight or hard to the touch.  Your uterus is a muscle.  When it contracts, you will feel it tighten like a fist.  A contraction doesn't have to be painful.  It may feel like cramping or lower back pain.  When the contraction stops, your uterus becomes soft again.  · It's normal for your uterus to contract at times during pregnancy.  This may happen when you first lie down, after sex, or after you walk up and down stairs.  · It is not normal to have regular, frequent contractions before your baby is due.  If you feel a contraction every 10 minutes or more often during 1 hour (more than five contractions in an hour), then your uterus is elias too much.  Call your health care provider if this happens.                                GOING THE FULL 40 WEEKS      More and more births are being scheduled a little early for non-medical reasons.  Experts are learning that this can cause problems for both mom and baby.  If your pregnancy is healthy, wait for labor to begin on its own.    If your pregnancy is healthy and you are planning to schedule your baby's birth, its best to stay pregnant for at  least 39 weeks.  Babies born too early may have more health problems at birth and later in life than babies born later.  Being pregnant 39 weeks gives your baby's body all the time it needs to grow.    Here's why your baby needs 39 weeks:    · Important organs, like his brain, lungs and liver, get all the time they need to develop  · He/she is less likely to have vision and hearing problems after birth  · He/she has time to gain more weight in the womb.  Babies born a healthy weight have an easier time staying warm than babies born too small  · He/she can suck and swallow and stay awake long to eat after he/she's born.  Babies born early sometimes can't do these things.         KICK COUNTS    Its normal to worry about your babys health. One way you can know your babys doing well is to record the babys movements once a day. This is called a kick count. You will usually feel your baby move by the 20th week of pregnancy. Remember to take your kick count records to all your appointments with your healthcare provider.     How to Count Kicks   Choose a time when the baby is active, such as after a meal.   Sit comfortably or lie on your side.   The first time the baby moves, write down the time.   Count each movement until the baby has moved 10 times. This can take from 20 minutes to 2 hours.   If the baby hasnt moved 4 times in 1 hour, pat your stomach to wake the baby up.   Write down the time you feel the babys 10th movement.   Try to do it at the same time each day.    When to Call Your Healthcare Provider     Call your healthcare provider right away if you notice any of the following:   Your baby moves fewer than 10 times in 4 hours while youre doing kick counts.   Your baby moves much less often than on the days before.   You have not felt your baby move all day.        TIME NUMBER OF KICKS

## 2019-08-11 NOTE — ED PROVIDER NOTES
"Encounter Date: 2019       History     Chief Complaint   Patient presents with    Abscess     Pt reports she has an abscess under left arm, in armpit area. Pt is 31 weeks pregnanty and reported having light contractions. She was sent to L&D and has been cleared.      28-year-old pregnant female presents ED for evaluation of abscess under left arm x4 days.  Patient was also complaining of "light" contractions, was sent to L&D for evaluation and discharged.  Denies any abdominal pain or vaginal bleeding.  Patient states that the abscess under her left arm has been reoccurring and this is the 3rd time that it has popped up.  Patient states that last two times she had an abscess there she had them drained. Patient also complain of abscess to her buttocks for the past few days.  No treatments tried.  Denies any alleviating or exacerbating factors.  Denies fever.  Denies any other complaints at this time.    The history is provided by the patient.     Review of patient's allergies indicates:  No Known Allergies  Past Medical History:   Diagnosis Date    Asthma      Past Surgical History:   Procedure Laterality Date     SECTION       Family History   Problem Relation Age of Onset    No Known Problems Mother     No Known Problems Father      Social History     Tobacco Use    Smoking status: Former Smoker     Types: Cigarettes     Last attempt to quit: 2018     Years since quittin.9    Smokeless tobacco: Never Used   Substance Use Topics    Alcohol use: No    Drug use: No     Review of Systems   Constitutional: Negative for chills and fever.   Gastrointestinal: Negative for abdominal pain.   Genitourinary: Negative for vaginal discharge.   Skin: Positive for wound (abscess under left arm and buttocks).   Psychiatric/Behavioral: The patient is nervous/anxious.    All other systems reviewed and are negative.      Physical Exam     Initial Vitals [19 1746]   BP Pulse Resp Temp SpO2   122/73 " 110 20 98.2 °F (36.8 °C) 98 %      MAP       --         Physical Exam    Vitals reviewed.  Constitutional: She appears well-developed and well-nourished.  Non-toxic appearance. She does not have a sickly appearance.   HENT:   Head: Atraumatic.   Mouth/Throat: Oropharynx is clear and moist.   Eyes: EOM are normal.   Neck: Normal range of motion, full passive range of motion without pain and phonation normal. Neck supple.   Cardiovascular: Regular rhythm. Tachycardia present.    Pulmonary/Chest: No respiratory distress.   Abdominal:    abdomen.    Neurological: She is alert and oriented to person, place, and time.   Skin: Skin is warm. Abscess noted.   Approximately 6 cm x 6 cm area of fluctuance noted under left axilla.  Approximately 3 cm x 3 cm area of fluctuance noted to left buttocks.  No induration or drainage noted.     Psychiatric: Her mood appears anxious.         ED Course   I & D - Incision and Drainage  Date/Time: 2019 8:53 PM  Performed by: Hugh Sanchez NP  Authorized by: Altagracia Bashir MD   Consent Done: Not Needed  Type: abscess  Body area: upper extremity (left axilla)    Anesthesia:  Local Anesthetic: lidocaine 1% without epinephrine  Scalpel size: 11  Incision type: single straight  Complexity: simple  Drainage: pus and  bloody  Drainage amount: moderate  Wound treatment: wound packed  Packing material: 1/2 in gauze  Complications: No  Specimens: No  Implants: No  Patient tolerance: Patient tolerated the procedure well with no immediate complications    I & D - Incision and Drainage  Date/Time: 2019 8:54 PM  Performed by: Hugh Sanchez NP  Authorized by: Altagracia Bashir MD   Consent Done: Not Needed  Type: abscess  Body area: lower extremity  Location details: left buttock    Anesthesia:  Local Anesthetic: lidocaine 1% without epinephrine  Scalpel size: 11  Incision type: single straight  Complexity: simple  Drainage: pus and  bloody  Drainage amount: moderate  Wound  "treatment: deloculation and  wound packed  Packing material: 1/2 in gauze  Complications: No  Specimens: No  Implants: No  Patient tolerance: Patient tolerated the procedure well with no immediate complications        Labs Reviewed - No data to display       Imaging Results    None          Medical Decision Making:   History:   I obtained history from: someone other than patient.  Old Medical Records: I decided to obtain old medical records.  Old Records Summarized: records from clinic visits.       <> Summary of Records: 4/20/19- Patient had IUP confirming IUP on twins.   Initial Assessment:   28-year-old pregnant female presents ED for evaluation of abscess under left arm x4 days.  Patient was also complaining of "light" contractions, was sent to L&D for evaluation and discharged.  Denies any abdominal pain or vaginal bleeding.  Patient states that the abscess under her left arm has been reoccurring and this is the 3rd time that it has popped up.  Patient states that last two times she had an abscess there she had them drained. Patient also complain of abscess to her buttocks for the past few days.  No treatments tried.  Denies any alleviating or exacerbating factors.  Denies fever.  Denies any other complaints at this time.      ED Management:  I&D  No imaging needed at this time.  Patient was cleared by OB prior to arriving in ED. I&D performed, please see procedure notes.  Patient is hemodynamically stable will be discharged home with prescriptions for Norco and Keflex.  Narcotic precautions given.  Patient instructed to return to ED in 2-3 days for wound check and to watch for signs of infection including any worsening redness, pus, warmth, or fever and if she notices these things please follow up with your primary care physician immediately return to emergency department.  Instructed to take medications as prescribed and to refer to the additional materials provided for further information including when to " return to the emergency department.  Patient verbalized understanding, compliance, and agreement treatment plan.  Other:   I discussed test(s) with the performing physician.       <> Summary of the Findings: Care of this patient discussed with Dr. Bashir who agrees with ED course and disposition.                   ED Course as of Aug 11 2109   Sun Aug 11, 2019   2107 Attestation: Patient seen by me separately from the midlevel/resident. I agree with the history, physical and management of the patient.   The patient presents the emergency department 31 weeks pregnant with twins with a large left axillary abscess as well as an abscess to her left buttocks.  Both abscesses were I indeed by me, the patient tolerated the procedures well and she will be discharged with pain medications and Keflex.    [ST]      ED Course User Index  [ST] Altagracia Bashir MD     Clinical Impression:       ICD-10-CM ICD-9-CM   1. Abscess of axilla, left L02.412 682.3   2. Abscess of buttock L02.31 682.5                                Hugh Sanchez, JUANPABLO  08/11/19 4011

## 2019-08-12 NOTE — DISCHARGE INSTRUCTIONS
Keep the area clean and dry.  Follow-up primary care physician return here and 2-3 days for wound recheck.  Watch for signs of infection including any worsening redness, pus, warmth, or fever.  If you notice these things please follow up with your primary care physician immediately return to emergency department.  Take medications as prescribed.  Do not drive, drink alcohol, or operate machinery while taking Norco.  Refer to the additional materials provided for further information including when to return to the emergency department.

## 2019-08-12 NOTE — ED NOTES
APPEARANCE: Alert, oriented and in no acute distress.  CARDIAC: Normal rate and rhythm, no murmur heard.   PERIPHERAL VASCULAR: peripheral pulses present. Normal cap refill. No edema. Warm to touch.    RESPIRATORY:Normal rate and effort, breath sounds clear bilaterally throughout chest. Respirations are equal and unlabored no obvious signs of distress.  GASTRO: soft, bowel sounds normal, no tenderness, no abdominal distention.  MUSC: Full ROM. No bony tenderness or soft tissue tenderness. No obvious deformity.  SKIN: Skin is warm and dry, normal skin turgor, mucous membranes moist. Patient has an abscess about 5x6 cm under her left forearm and one 3x4 cm on left but cheek.   NEURO: 5/5 strength major flexors/extensors bilaterally. Sensory intact to light touch bilaterally. Whitefield coma scale: eyes open spontaneously-4, oriented & converses-5, obeys commands-6. No neurological abnormalities.   MENTAL STATUS: awake, alert and aware of environment.  EYE: PERRL, both eyes: pupils brisk and reactive to light. Normal size.  ENT: EARS: no obvious drainage. NOSE: no active bleeding.   celina.

## 2019-08-12 NOTE — PROVIDER PROGRESS NOTES - EMERGENCY DEPT.
Encounter Date: 8/11/2019    ED Physician Progress Notes             Prescription for Norco 5-325 mg was accidentally written for 110 tablets. [facility] pharmacy called to verify and I told them prescription was accidentally written wrong and to dispense 10 tablets of Norco.  To clarify patient will be given 10 tablets of Norco.

## 2019-08-29 ENCOUNTER — HOSPITAL ENCOUNTER (OUTPATIENT)
Facility: HOSPITAL | Age: 29
Discharge: HOME | End: 2019-08-29
Attending: OBSTETRICS & GYNECOLOGY | Admitting: OBSTETRICS & GYNECOLOGY
Payer: MEDICAID

## 2019-08-29 VITALS — RESPIRATION RATE: 18 BRPM | TEMPERATURE: 99 F

## 2019-08-29 DIAGNOSIS — O26.899 ABDOMINAL PAIN AFFECTING PREGNANCY: ICD-10-CM

## 2019-08-29 DIAGNOSIS — R10.9 ABDOMINAL PAIN AFFECTING PREGNANCY: ICD-10-CM

## 2019-08-29 PROCEDURE — 96360 HYDRATION IV INFUSION INIT: CPT

## 2019-08-29 PROCEDURE — 99211 OFF/OP EST MAY X REQ PHY/QHP: CPT | Mod: 25

## 2019-08-29 PROCEDURE — G0378 HOSPITAL OBSERVATION PER HR: HCPCS

## 2019-08-29 NOTE — DISCHARGE INSTRUCTIONS
Follow Labor Precautions:  Call MD or return to Labor and Delivery if...    Labor (after 36 weeks)  - Painful contractions every 5 minutes for 2 hours that do not go away with 2 bottles of water, 2 tylenol, and rest.      Labor (before 36 weeks)  - More than 4 contractions in 1 hour   -First try 2 bottles of water, rest, and 2 tylenol.... If the contractions do not go away call doctors office or after hours clinic first and/or come to hospital for evaluation.      Water Breaks  - Gush or leaking of fluid from vagina, or if unsure.     Vaginal bleeding  - Bright red bleeding like a period, soaking a pad in 1 hour.    Decreased or No fetal movement  - You should feel 10 movements within two hours.  -If you are not feeling the baby move.... Drink a glass of orange juice or apple juice  - If you DO NOT feel 10 movements within two hours, please  call the MD or come to the hospital.    Unable to keep fluids or food down for more than 24 hours    Blurred vision, spots before your eyes, dizziness, headache that does not get better with Tylenol (Acetaminophen), bad swelling, chest pain, or trouble breathing.    Drink 10-12 glasses of water daily  Take medications as prescribed  Keep all follow-up appointments    LABOR     What is  labor?   labor is labor that occurs before 37 completed weeks of pregnancy.  Your baby could be born to early and have serious health problems.    Will you have  labor?   labor can happen to any woman, but some women are at greater risk than others.  A woman is more likely to have  labor if she:  · Is pregnant with twins or more  · Had a premature birth (before 37 completed weeks of pregnancy), in another pregnancy  · Has certain problems of the uterus or cervix    Call your health care provider or go to the hospital right away if you have any of these warning signs:  · Contractions that make your belly tighten up like a fist every 10 minutes or  more often  · Change in the color of your vaginal discharge, or bleeding from your vagina  · The feeling that your baby is pushing down.  This is called pelvic pressure  · Low, dull backache  · Cramps that feel like your period  · Belly cramps with or without diarrhea      Home Care:   It can help to drink plenty of water and take warm baths. Do what you can ahead of time to prepare for giving birth so youll have less to worry about later.   Keep a record of your contractions. Write down what time each one starts and how long it lasts. A stopwatch is helpful. Look for the pattern of regularly spaced out contractions with a gradual increase in the time each one lasts.   Dont be embarrassed about going to the hospital with a false alarm.     More about contractions    · When any muscle in your body contracts, it becomes tight or hard to the touch.  Your uterus is a muscle.  When it contracts, you will feel it tighten like a fist.  A contraction doesn't have to be painful.  It may feel like cramping or lower back pain.  When the contraction stops, your uterus becomes soft again.  · It's normal for your uterus to contract at times during pregnancy.  This may happen when you first lie down, after sex, or after you walk up and down stairs.  · It is not normal to have regular, frequent contractions before your baby is due.  If you feel a contraction every 10 minutes or more often during 1 hour (more than five contractions in an hour), then your uterus is elias too much.  Call your health care provider if this happens.                                GOING THE FULL 40 WEEKS      More and more births are being scheduled a little early for non-medical reasons.  Experts are learning that this can cause problems for both mom and baby.  If your pregnancy is healthy, wait for labor to begin on its own.    If your pregnancy is healthy and you are planning to schedule your baby's birth, its best to stay pregnant for at  least 39 weeks.  Babies born too early may have more health problems at birth and later in life than babies born later.  Being pregnant 39 weeks gives your baby's body all the time it needs to grow.    Here's why your baby needs 39 weeks:    · Important organs, like his brain, lungs and liver, get all the time they need to develop  · He/she is less likely to have vision and hearing problems after birth  · He/she has time to gain more weight in the womb.  Babies born a healthy weight have an easier time staying warm than babies born too small  · He/she can suck and swallow and stay awake long to eat after he/she's born.  Babies born early sometimes can't do these things.                                                                 KICK COUNTS    Its normal to worry about your babys health. One way you can know your babys doing well is to record the babys movements once a day. This is called a kick count. You will usually feel your baby move by the 20th week of pregnancy. Remember to take your kick count records to all your appointments with your healthcare provider.     How to Count Kicks   Choose a time when the baby is active, such as after a meal.   Sit comfortably or lie on your side.   The first time the baby moves, write down the time.   Count each movement until the baby has moved 10 times. This can take from 20 minutes to 2 hours.   If the baby hasnt moved 4 times in 1 hour, pat your stomach to wake the baby up.   Write down the time you feel the babys 10th movement.   Try to do it at the same time each day.    When to Call Your Healthcare Provider     Call your healthcare provider right away if you notice any of the following:   Your baby moves fewer than 10 times in 4 hours while youre doing kick counts.   Your baby moves much less often than on the days before.   You have not felt your baby move all day.        TIME NUMBER OF KICKS                                                    LABOR     What is  labor?   labor is labor that occurs before 37 completed weeks of pregnancy.  Your baby could be born to early and have serious health problems.    Will you have  labor?   labor can happen to any woman, but some women are at greater risk than others.  A woman is more likely to have  labor if she:  · Is pregnant with twins or more  · Had a premature birth (before 37 completed weeks of pregnancy), in another pregnancy  · Has certain problems of the uterus or cervix    Call your health care provider or go to the hospital right away if you have any of these warning signs:  · Contractions that make your belly tighten up like a fist every 10 minutes or more often  · Change in the color of your vaginal discharge, or bleeding from your vagina  · The feeling that your baby is pushing down.  This is called pelvic pressure  · Low, dull backache  · Cramps that feel like your period  · Belly cramps with or without diarrhea      Home Care:   It can help to drink plenty of water and take warm baths. Do what you can ahead of time to prepare for giving birth so youll have less to worry about later.   Keep a record of your contractions. Write down what time each one starts and how long it lasts. A stopwatch is helpful. Look for the pattern of regularly spaced out contractions with a gradual increase in the time each one lasts.   Dont be embarrassed about going to the hospital with a false alarm.     More about contractions    · When any muscle in your body contracts, it becomes tight or hard to the touch.  Your uterus is a muscle.  When it contracts, you will feel it tighten like a fist.  A contraction doesn't have to be painful.  It may feel like cramping or lower back pain.  When the contraction stops, your uterus becomes soft again.  · It's normal for your uterus to contract at times during pregnancy.  This may happen when you first lie down, after sex, or after you walk up  and down stairs.  · It is not normal to have regular, frequent contractions before your baby is due.  If you feel a contraction every 10 minutes or more often during 1 hour (more than five contractions in an hour), then your uterus is elias too much.  Call your health care provider if this happens.                                GOING THE FULL 40 WEEKS      More and more births are being scheduled a little early for non-medical reasons.  Experts are learning that this can cause problems for both mom and baby.  If your pregnancy is healthy, wait for labor to begin on its own.    If your pregnancy is healthy and you are planning to schedule your baby's birth, its best to stay pregnant for at least 39 weeks.  Babies born too early may have more health problems at birth and later in life than babies born later.  Being pregnant 39 weeks gives your baby's body all the time it needs to grow.    Here's why your baby needs 39 weeks:    · Important organs, like his brain, lungs and liver, get all the time they need to develop  · He/she is less likely to have vision and hearing problems after birth  · He/she has time to gain more weight in the womb.  Babies born a healthy weight have an easier time staying warm than babies born too small  · He/she can suck and swallow and stay awake long to eat after he/she's born.  Babies born early sometimes can't do these things.           KICK COUNTS    Its normal to worry about your babys health. One way you can know your babys doing well is to record the babys movements once a day. This is called a kick count. You will usually feel your baby move by the 20th week of pregnancy. Remember to take your kick count records to all your appointments with your healthcare provider.     How to Count Kicks   Choose a time when the baby is active, such as after a meal.   Sit comfortably or lie on your side.   The first time the baby moves, write down the time.   Count each movement until  the baby has moved 10 times. This can take from 20 minutes to 2 hours.   If the baby hasnt moved 4 times in 1 hour, pat your stomach to wake the baby up.   Write down the time you feel the babys 10th movement.   Try to do it at the same time each day.    When to Call Your Healthcare Provider     Call your healthcare provider right away if you notice any of the following:   Your baby moves fewer than 10 times in 4 hours while youre doing kick counts.   Your baby moves much less often than on the days before.   You have not felt your baby move all day.        TIME NUMBER OF KICKS

## 2019-08-29 NOTE — PLAN OF CARE
pt arrived on unit from er via wheelchair, gowned and efm applied, pt complains of having ctx for a few hours, she denies vag bleeding and leaking fluid, states babies are not moving as much, head to toe assessment completed, explained plan of care.

## 2019-08-31 ENCOUNTER — ANESTHESIA EVENT (OUTPATIENT)
Dept: OBSTETRICS AND GYNECOLOGY | Facility: HOSPITAL | Age: 29
End: 2019-08-31
Payer: MEDICAID

## 2019-08-31 ENCOUNTER — ANESTHESIA (OUTPATIENT)
Dept: OBSTETRICS AND GYNECOLOGY | Facility: HOSPITAL | Age: 29
End: 2019-08-31
Payer: MEDICAID

## 2019-08-31 ENCOUNTER — ANESTHESIA EVENT (OUTPATIENT)
Dept: OBSTETRICS AND GYNECOLOGY | Facility: HOSPITAL | Age: 29
End: 2019-08-31

## 2019-08-31 ENCOUNTER — HOSPITAL ENCOUNTER (INPATIENT)
Facility: HOSPITAL | Age: 29
LOS: 4 days | Discharge: HOME | End: 2019-09-04
Attending: OBSTETRICS & GYNECOLOGY | Admitting: SPECIALIST
Payer: MEDICAID

## 2019-08-31 ENCOUNTER — ANESTHESIA (OUTPATIENT)
Dept: OBSTETRICS AND GYNECOLOGY | Facility: HOSPITAL | Age: 29
End: 2019-08-31

## 2019-08-31 DIAGNOSIS — O42.90 AMNIOTIC FLUID LEAKING: ICD-10-CM

## 2019-08-31 LAB
ABO + RH BLD: NORMAL
ABO + RH BLD: NORMAL
BASOPHILS # BLD AUTO: 0.01 K/UL (ref 0–0.2)
BASOPHILS NFR BLD: 0.1 % (ref 0–1.9)
BLD GP AB SCN CELLS X3 SERPL QL: NORMAL
BLD GP AB SCN CELLS X3 SERPL QL: NORMAL
BLOOD GROUP ANTIBODIES SERPL: NORMAL
DAT IGG-SP REAG RBC-IMP: NORMAL
DIFFERENTIAL METHOD: ABNORMAL
EOSINOPHIL # BLD AUTO: 0 K/UL (ref 0–0.5)
EOSINOPHIL NFR BLD: 0.4 % (ref 0–8)
ERYTHROCYTE [DISTWIDTH] IN BLOOD BY AUTOMATED COUNT: 13.6 % (ref 11.5–14.5)
FETAL CELL SCN BLD QL ROSETTE: NORMAL
HCT VFR BLD AUTO: 37.7 % (ref 37–48.5)
HGB BLD-MCNC: 12.1 G/DL (ref 12–16)
LYMPHOCYTES # BLD AUTO: 1.2 K/UL (ref 1–4.8)
LYMPHOCYTES NFR BLD: 14.9 % (ref 18–48)
MCH RBC QN AUTO: 27.7 PG (ref 27–31)
MCHC RBC AUTO-ENTMCNC: 32.1 G/DL (ref 32–36)
MCV RBC AUTO: 86 FL (ref 82–98)
MONOCYTES # BLD AUTO: 0.8 K/UL (ref 0.3–1)
MONOCYTES NFR BLD: 9.4 % (ref 4–15)
NEUTROPHILS # BLD AUTO: 5.9 K/UL (ref 1.8–7.7)
NEUTROPHILS NFR BLD: 75.2 % (ref 38–73)
PLATELET # BLD AUTO: 183 K/UL (ref 150–350)
PMV BLD AUTO: 12.3 FL (ref 9.2–12.9)
RBC # BLD AUTO: 4.37 M/UL (ref 4–5.4)
RPR SER QL: NORMAL
WBC # BLD AUTO: 7.94 K/UL (ref 3.9–12.7)

## 2019-08-31 PROCEDURE — 63600175 PHARM REV CODE 636 W HCPCS: Performed by: SPECIALIST

## 2019-08-31 PROCEDURE — 63600175 PHARM REV CODE 636 W HCPCS: Performed by: OBSTETRICS & GYNECOLOGY

## 2019-08-31 PROCEDURE — 86850 RBC ANTIBODY SCREEN: CPT | Mod: 91

## 2019-08-31 PROCEDURE — 25000003 PHARM REV CODE 250: Performed by: OBSTETRICS & GYNECOLOGY

## 2019-08-31 PROCEDURE — 88307 TISSUE EXAM BY PATHOLOGIST: CPT | Performed by: PATHOLOGY

## 2019-08-31 PROCEDURE — 86850 RBC ANTIBODY SCREEN: CPT

## 2019-08-31 PROCEDURE — 86592 SYPHILIS TEST NON-TREP QUAL: CPT

## 2019-08-31 PROCEDURE — 88307 TISSUE EXAM BY PATHOLOGIST: CPT | Mod: 26,,, | Performed by: PATHOLOGY

## 2019-08-31 PROCEDURE — 37000009 HC ANESTHESIA EA ADD 15 MINS: Performed by: OBSTETRICS & GYNECOLOGY

## 2019-08-31 PROCEDURE — 25000003 PHARM REV CODE 250: Performed by: SPECIALIST

## 2019-08-31 PROCEDURE — 27201121 HC TRAY,SPINAL-HYPERBARIC: Performed by: ANESTHESIOLOGY

## 2019-08-31 PROCEDURE — 88307 TISSUE SPECIMEN TO PATHOLOGY - SURGERY: ICD-10-PCS | Mod: 26,,, | Performed by: PATHOLOGY

## 2019-08-31 PROCEDURE — 63600175 PHARM REV CODE 636 W HCPCS: Performed by: ANESTHESIOLOGY

## 2019-08-31 PROCEDURE — 37000008 HC ANESTHESIA 1ST 15 MINUTES: Performed by: OBSTETRICS & GYNECOLOGY

## 2019-08-31 PROCEDURE — 99211 OFF/OP EST MAY X REQ PHY/QHP: CPT | Mod: 25

## 2019-08-31 PROCEDURE — 86870 RBC ANTIBODY IDENTIFICATION: CPT

## 2019-08-31 PROCEDURE — 63600175 PHARM REV CODE 636 W HCPCS: Performed by: STUDENT IN AN ORGANIZED HEALTH CARE EDUCATION/TRAINING PROGRAM

## 2019-08-31 PROCEDURE — 85461 HEMOGLOBIN FETAL: CPT

## 2019-08-31 PROCEDURE — 85025 COMPLETE CBC W/AUTO DIFF WBC: CPT

## 2019-08-31 PROCEDURE — 51702 INSERT TEMP BLADDER CATH: CPT

## 2019-08-31 PROCEDURE — 36000686 HC CESAREAN SECTION LEVEL II

## 2019-08-31 PROCEDURE — 86880 COOMBS TEST DIRECT: CPT

## 2019-08-31 PROCEDURE — 36415 COLL VENOUS BLD VENIPUNCTURE: CPT

## 2019-08-31 PROCEDURE — 25000003 PHARM REV CODE 250: Performed by: ANESTHESIOLOGY

## 2019-08-31 PROCEDURE — S0028 INJECTION, FAMOTIDINE, 20 MG: HCPCS | Performed by: ANESTHESIOLOGY

## 2019-08-31 PROCEDURE — 72100002 HC LABOR CARE, 1ST 8 HOURS

## 2019-08-31 PROCEDURE — 59025 FETAL NON-STRESS TEST: CPT

## 2019-08-31 PROCEDURE — 11000001 HC ACUTE MED/SURG PRIVATE ROOM

## 2019-08-31 RX ORDER — METHYLERGONOVINE MALEATE 0.2 MG/ML
INJECTION INTRAVENOUS
Status: DISCONTINUED | OUTPATIENT
Start: 2019-08-31 | End: 2019-08-31

## 2019-08-31 RX ORDER — DOCUSATE SODIUM 100 MG/1
200 CAPSULE, LIQUID FILLED ORAL 2 TIMES DAILY
Status: DISCONTINUED | OUTPATIENT
Start: 2019-08-31 | End: 2019-09-04 | Stop reason: HOSPADM

## 2019-08-31 RX ORDER — PHENYLEPHRINE HYDROCHLORIDE 10 MG/ML
INJECTION INTRAVENOUS
Status: DISCONTINUED | OUTPATIENT
Start: 2019-08-31 | End: 2019-08-31

## 2019-08-31 RX ORDER — MISOPROSTOL 200 UG/1
800 TABLET ORAL
Status: DISCONTINUED | OUTPATIENT
Start: 2019-08-31 | End: 2019-09-04 | Stop reason: HOSPADM

## 2019-08-31 RX ORDER — SODIUM CHLORIDE, SODIUM LACTATE, POTASSIUM CHLORIDE, CALCIUM CHLORIDE 600; 310; 30; 20 MG/100ML; MG/100ML; MG/100ML; MG/100ML
INJECTION, SOLUTION INTRAVENOUS CONTINUOUS
Status: ACTIVE | OUTPATIENT
Start: 2019-08-31 | End: 2019-08-31

## 2019-08-31 RX ORDER — SODIUM CHLORIDE, SODIUM LACTATE, POTASSIUM CHLORIDE, CALCIUM CHLORIDE 600; 310; 30; 20 MG/100ML; MG/100ML; MG/100ML; MG/100ML
INJECTION, SOLUTION INTRAVENOUS CONTINUOUS
Status: DISCONTINUED | OUTPATIENT
Start: 2019-08-31 | End: 2019-09-04 | Stop reason: HOSPADM

## 2019-08-31 RX ORDER — SODIUM CITRATE AND CITRIC ACID MONOHYDRATE 334; 500 MG/5ML; MG/5ML
30 SOLUTION ORAL
Status: DISCONTINUED | OUTPATIENT
Start: 2019-08-31 | End: 2019-09-04 | Stop reason: HOSPADM

## 2019-08-31 RX ORDER — OXYTOCIN/RINGER'S LACTATE 20/1000 ML
333 PLASTIC BAG, INJECTION (ML) INTRAVENOUS CONTINUOUS
Status: ACTIVE | OUTPATIENT
Start: 2019-08-31 | End: 2019-08-31

## 2019-08-31 RX ORDER — DIPHENHYDRAMINE HCL 25 MG
25 CAPSULE ORAL EVERY 4 HOURS PRN
Status: DISCONTINUED | OUTPATIENT
Start: 2019-08-31 | End: 2019-09-04 | Stop reason: HOSPADM

## 2019-08-31 RX ORDER — FAMOTIDINE 10 MG/ML
20 INJECTION INTRAVENOUS ONCE
Status: DISCONTINUED | OUTPATIENT
Start: 2019-08-31 | End: 2019-09-04 | Stop reason: HOSPADM

## 2019-08-31 RX ORDER — DIPHENHYDRAMINE HYDROCHLORIDE 50 MG/ML
12.5 INJECTION INTRAMUSCULAR; INTRAVENOUS NIGHTLY PRN
Status: DISCONTINUED | OUTPATIENT
Start: 2019-08-31 | End: 2019-09-04 | Stop reason: HOSPADM

## 2019-08-31 RX ORDER — OXYCODONE AND ACETAMINOPHEN 5; 325 MG/1; MG/1
1 TABLET ORAL EVERY 4 HOURS PRN
Status: DISCONTINUED | OUTPATIENT
Start: 2019-08-31 | End: 2019-09-04 | Stop reason: HOSPADM

## 2019-08-31 RX ORDER — ONDANSETRON 8 MG/1
8 TABLET, ORALLY DISINTEGRATING ORAL EVERY 8 HOURS PRN
Status: DISCONTINUED | OUTPATIENT
Start: 2019-08-31 | End: 2019-09-04 | Stop reason: HOSPADM

## 2019-08-31 RX ORDER — CARBOPROST TROMETHAMINE 250 UG/ML
INJECTION, SOLUTION INTRAMUSCULAR
Status: DISCONTINUED
Start: 2019-08-31 | End: 2019-08-31 | Stop reason: WASHOUT

## 2019-08-31 RX ORDER — ADHESIVE BANDAGE
30 BANDAGE TOPICAL 2 TIMES DAILY PRN
Status: DISCONTINUED | OUTPATIENT
Start: 2019-09-01 | End: 2019-09-04 | Stop reason: HOSPADM

## 2019-08-31 RX ORDER — NAPROXEN 500 MG/1
500 TABLET ORAL EVERY 8 HOURS PRN
Status: DISCONTINUED | OUTPATIENT
Start: 2019-08-31 | End: 2019-09-04 | Stop reason: HOSPADM

## 2019-08-31 RX ORDER — NALBUPHINE HYDROCHLORIDE 20 MG/ML
2.5 INJECTION, SOLUTION INTRAMUSCULAR; INTRAVENOUS; SUBCUTANEOUS
Status: DISCONTINUED | OUTPATIENT
Start: 2019-08-31 | End: 2019-09-04 | Stop reason: HOSPADM

## 2019-08-31 RX ORDER — DIPHENHYDRAMINE HYDROCHLORIDE 50 MG/ML
12.5 INJECTION INTRAMUSCULAR; INTRAVENOUS
Status: DISCONTINUED | OUTPATIENT
Start: 2019-08-31 | End: 2019-09-04 | Stop reason: HOSPADM

## 2019-08-31 RX ORDER — OXYTOCIN 10 [USP'U]/ML
INJECTION, SOLUTION INTRAMUSCULAR; INTRAVENOUS
Status: DISCONTINUED | OUTPATIENT
Start: 2019-08-31 | End: 2019-08-31

## 2019-08-31 RX ORDER — SODIUM CITRATE AND CITRIC ACID MONOHYDRATE 334; 500 MG/5ML; MG/5ML
30 SOLUTION ORAL ONCE
Status: DISCONTINUED | OUTPATIENT
Start: 2019-08-31 | End: 2019-09-04 | Stop reason: HOSPADM

## 2019-08-31 RX ORDER — CEFAZOLIN SODIUM 2 G/50ML
2 SOLUTION INTRAVENOUS
Status: DISCONTINUED | OUTPATIENT
Start: 2019-08-31 | End: 2019-09-04 | Stop reason: HOSPADM

## 2019-08-31 RX ORDER — ACETAMINOPHEN 325 MG/1
650 TABLET ORAL EVERY 6 HOURS
Status: ACTIVE | OUTPATIENT
Start: 2019-08-31 | End: 2019-09-01

## 2019-08-31 RX ORDER — SIMETHICONE 80 MG
1 TABLET,CHEWABLE ORAL EVERY 6 HOURS PRN
Status: DISCONTINUED | OUTPATIENT
Start: 2019-08-31 | End: 2019-09-04 | Stop reason: HOSPADM

## 2019-08-31 RX ORDER — MISOPROSTOL 200 UG/1
TABLET ORAL
Status: DISCONTINUED
Start: 2019-08-31 | End: 2019-08-31 | Stop reason: WASHOUT

## 2019-08-31 RX ORDER — DIPHENHYDRAMINE HCL 25 MG
25 CAPSULE ORAL EVERY 4 HOURS PRN
Status: DISCONTINUED | OUTPATIENT
Start: 2019-08-31 | End: 2019-08-31

## 2019-08-31 RX ORDER — OXYCODONE AND ACETAMINOPHEN 10; 325 MG/1; MG/1
1 TABLET ORAL EVERY 4 HOURS PRN
Status: DISCONTINUED | OUTPATIENT
Start: 2019-08-31 | End: 2019-09-04 | Stop reason: HOSPADM

## 2019-08-31 RX ORDER — FAMOTIDINE 10 MG/ML
20 INJECTION INTRAVENOUS ONCE
Status: COMPLETED | OUTPATIENT
Start: 2019-08-31 | End: 2019-08-31

## 2019-08-31 RX ORDER — BUPIVACAINE HYDROCHLORIDE 2.5 MG/ML
30 INJECTION, SOLUTION EPIDURAL; INFILTRATION; INTRACAUDAL ONCE
Status: DISCONTINUED | OUTPATIENT
Start: 2019-08-31 | End: 2019-09-04 | Stop reason: HOSPADM

## 2019-08-31 RX ORDER — OXYTOCIN/RINGER'S LACTATE 20/1000 ML
41.65 PLASTIC BAG, INJECTION (ML) INTRAVENOUS CONTINUOUS
Status: ACTIVE | OUTPATIENT
Start: 2019-08-31 | End: 2019-08-31

## 2019-08-31 RX ORDER — NALOXONE HCL 0.4 MG/ML
0.04 VIAL (ML) INJECTION EVERY 10 MIN PRN
Status: DISCONTINUED | OUTPATIENT
Start: 2019-08-31 | End: 2019-08-31

## 2019-08-31 RX ORDER — MUPIROCIN 20 MG/G
OINTMENT TOPICAL
Status: DISCONTINUED | OUTPATIENT
Start: 2019-08-31 | End: 2019-09-04 | Stop reason: HOSPADM

## 2019-08-31 RX ORDER — BISACODYL 10 MG
10 SUPPOSITORY, RECTAL RECTAL ONCE AS NEEDED
Status: DISCONTINUED | OUTPATIENT
Start: 2019-08-31 | End: 2019-09-04 | Stop reason: HOSPADM

## 2019-08-31 RX ORDER — ONDANSETRON 2 MG/ML
4 INJECTION INTRAMUSCULAR; INTRAVENOUS EVERY 12 HOURS PRN
Status: DISCONTINUED | OUTPATIENT
Start: 2019-08-31 | End: 2019-09-04 | Stop reason: HOSPADM

## 2019-08-31 RX ORDER — OXYTOCIN 10 [USP'U]/ML
INJECTION, SOLUTION INTRAMUSCULAR; INTRAVENOUS
Status: DISCONTINUED
Start: 2019-08-31 | End: 2019-08-31 | Stop reason: WASHOUT

## 2019-08-31 RX ORDER — MUPIROCIN 20 MG/G
1 OINTMENT TOPICAL 2 TIMES DAILY
Status: DISCONTINUED | OUTPATIENT
Start: 2019-08-31 | End: 2019-09-04 | Stop reason: HOSPADM

## 2019-08-31 RX ORDER — METOCLOPRAMIDE HYDROCHLORIDE 5 MG/ML
10 INJECTION INTRAMUSCULAR; INTRAVENOUS EVERY 6 HOURS PRN
Status: DISCONTINUED | OUTPATIENT
Start: 2019-08-31 | End: 2019-09-04 | Stop reason: HOSPADM

## 2019-08-31 RX ORDER — HYDROCORTISONE 25 MG/G
CREAM TOPICAL 3 TIMES DAILY PRN
Status: DISCONTINUED | OUTPATIENT
Start: 2019-08-31 | End: 2019-09-04 | Stop reason: HOSPADM

## 2019-08-31 RX ORDER — OXYTOCIN/RINGER'S LACTATE 20/1000 ML
41.7 PLASTIC BAG, INJECTION (ML) INTRAVENOUS CONTINUOUS
Status: ACTIVE | OUTPATIENT
Start: 2019-08-31 | End: 2019-08-31

## 2019-08-31 RX ORDER — KETOROLAC TROMETHAMINE 30 MG/ML
30 INJECTION, SOLUTION INTRAMUSCULAR; INTRAVENOUS EVERY 6 HOURS
Status: COMPLETED | OUTPATIENT
Start: 2019-08-31 | End: 2019-09-01

## 2019-08-31 RX ORDER — MORPHINE SULFATE 4 MG/ML
2 INJECTION, SOLUTION INTRAMUSCULAR; INTRAVENOUS
Status: DISPENSED | OUTPATIENT
Start: 2019-08-31 | End: 2019-09-01

## 2019-08-31 RX ORDER — METHYLERGONOVINE MALEATE 0.2 MG/ML
INJECTION INTRAVENOUS
Status: COMPLETED
Start: 2019-08-31 | End: 2019-08-31

## 2019-08-31 RX ADMIN — PHENYLEPHRINE HYDROCHLORIDE 150 MCG: 10 INJECTION INTRAVENOUS at 02:08

## 2019-08-31 RX ADMIN — PHENYLEPHRINE HYDROCHLORIDE 100 MCG: 10 INJECTION INTRAVENOUS at 02:08

## 2019-08-31 RX ADMIN — MORPHINE SULFATE 2 MG: 4 INJECTION INTRAVENOUS at 09:08

## 2019-08-31 RX ADMIN — OXYTOCIN 30 UNITS: 10 INJECTION, SOLUTION INTRAMUSCULAR; INTRAVENOUS at 03:08

## 2019-08-31 RX ADMIN — DOCUSATE SODIUM 200 MG: 100 CAPSULE, LIQUID FILLED ORAL at 08:08

## 2019-08-31 RX ADMIN — DIPHENHYDRAMINE HYDROCHLORIDE 25 MG: 25 CAPSULE ORAL at 06:08

## 2019-08-31 RX ADMIN — SODIUM CHLORIDE, SODIUM LACTATE, POTASSIUM CHLORIDE, AND CALCIUM CHLORIDE 1000 ML: .6; .31; .03; .02 INJECTION, SOLUTION INTRAVENOUS at 01:08

## 2019-08-31 RX ADMIN — FAMOTIDINE 20 MG: 10 INJECTION, SOLUTION INTRAVENOUS at 02:08

## 2019-08-31 RX ADMIN — MUPIROCIN 1 G: 20 OINTMENT TOPICAL at 08:08

## 2019-08-31 RX ADMIN — OXYCODONE HYDROCHLORIDE AND ACETAMINOPHEN 1 TABLET: 10; 325 TABLET ORAL at 08:08

## 2019-08-31 RX ADMIN — KETOROLAC TROMETHAMINE 30 MG: 30 INJECTION, SOLUTION INTRAMUSCULAR at 04:08

## 2019-08-31 RX ADMIN — METHYLERGONOVINE MALEATE 200 MCG: 0.2 INJECTION INTRAMUSCULAR; INTRAVENOUS at 03:08

## 2019-08-31 RX ADMIN — SODIUM CHLORIDE, SODIUM LACTATE, POTASSIUM CHLORIDE, AND CALCIUM CHLORIDE: .6; .31; .03; .02 INJECTION, SOLUTION INTRAVENOUS at 11:08

## 2019-08-31 RX ADMIN — MUPIROCIN 1 G: 20 OINTMENT TOPICAL at 09:08

## 2019-08-31 RX ADMIN — NALBUPHINE HYDROCHLORIDE 2.6 MG: 20 INJECTION, SOLUTION INTRAMUSCULAR; INTRAVENOUS; SUBCUTANEOUS at 09:08

## 2019-08-31 RX ADMIN — AZITHROMYCIN MONOHYDRATE 500 MG: 500 INJECTION, POWDER, LYOPHILIZED, FOR SOLUTION INTRAVENOUS at 02:08

## 2019-08-31 RX ADMIN — KETOROLAC TROMETHAMINE 30 MG: 30 INJECTION, SOLUTION INTRAMUSCULAR at 09:08

## 2019-08-31 RX ADMIN — OXYCODONE HYDROCHLORIDE AND ACETAMINOPHEN 1 TABLET: 10; 325 TABLET ORAL at 06:08

## 2019-08-31 RX ADMIN — NALBUPHINE HYDROCHLORIDE 2.6 MG: 20 INJECTION, SOLUTION INTRAMUSCULAR; INTRAVENOUS; SUBCUTANEOUS at 01:08

## 2019-08-31 RX ADMIN — SODIUM CITRATE AND CITRIC ACID MONOHYDRATE 30 ML: 500; 334 SOLUTION ORAL at 01:08

## 2019-08-31 RX ADMIN — OXYCODONE HYDROCHLORIDE AND ACETAMINOPHEN 1 TABLET: 10; 325 TABLET ORAL at 04:08

## 2019-08-31 NOTE — NURSING
Called  Dr Devine's answer  Service  For  Diet   Change.... Luzma at [facility]... Dr Marquez on call... Waiting for  Answer   She  Is  To return call

## 2019-08-31 NOTE — PLAN OF CARE
Problem: Pain (Postpartum  Delivery)  Goal: Acceptable Pain Control  Outcome: Ongoing (interventions implemented as appropriate)  +poc   Call for any changes  In pain

## 2019-08-31 NOTE — NURSING
27 y/o , 2 prev c/s, admitted for c/o ctxs and srom at 2330. grossly ruptured to visual, 1.5cm dil per sve. Pt is for r-c/s with twins at 34.4 weeks. Twin a easily found right mid abdomen with monitor; twin b lower left quadrant. Non reactive nst x 2. ctxs palpated at q 2-4min but toco not picking them up.

## 2019-08-31 NOTE — PLAN OF CARE
Problem: Adult Inpatient Plan of Care  Goal: Plan of Care Review  Outcome: Outcome(s) achieved Date Met: 08/31/19  Care plan discuss +poc

## 2019-08-31 NOTE — NURSING
Pt to recovery, s/p r-c/s. 30u pit/ liter of lr infusing, placed on pump. Lt abd drsg c/d/i. Ruggiero patent, draining clear urine to urometer bag, pt calm, no c/o pain.

## 2019-08-31 NOTE — NURSING
Pt  Back to room 355 per bed name # on board ... She is aware   L-D will be  Taking care of her at present

## 2019-08-31 NOTE — ANESTHESIA POSTPROCEDURE EVALUATION
Anesthesia Post Evaluation    Patient: Kelly Norton    Procedure(s) Performed: Procedure(s) (LRB):   SECTION (N/A)    Final Anesthesia Type: spinal  Patient location during evaluation: labor & delivery  Patient participation: Yes- Able to Participate  Level of consciousness: awake and alert and oriented  Post-procedure vital signs: reviewed and stable  Pain management: adequate  Airway patency: patent  PONV status at discharge: No PONV  Anesthetic complications: no      Cardiovascular status: hemodynamically stable  Respiratory status: room air and spontaneous ventilation  Hydration status: euvolemic  Follow-up not needed.          Vitals Value Taken Time   /64 2019  7:04 AM   Temp 36.7 °C (98.1 °F) 2019  7:00 AM   Pulse 83 2019  7:04 AM   Resp 18 2019  7:00 AM   SpO2 99 % 2019  7:04 AM   Vitals shown include unvalidated device data.      No case tracking events are documented in the log.      Pain/Alex Score: Pain Rating Prior to Med Admin: 6 (2019  9:10 AM)  Pain Rating Post Med Admin: 0 (2019 10:00 AM)        No headache/neckache/backache  Full return of neurological function  Able to urinate  Advised patient to report any new problems of back pain, especially with fever or decreasing bladder function occurring during coming days to weeks

## 2019-08-31 NOTE — ANESTHESIA PREPROCEDURE EVALUATION
2019  Kelly Norton is a 28 y.o., female here for repeat  for twins    Past Medical History:   Diagnosis Date    Asthma      Past Surgical History:   Procedure Laterality Date     SECTION           Anesthesia Evaluation    I have reviewed the Patient Summary Reports.    I have reviewed the Nursing Notes.   I have reviewed the Medications.     Review of Systems  Anesthesia Hx:  History of prior surgery of interest to airway management or planning:  Denies Personal Hx of Anesthesia complications.   Hematology/Oncology:        Hematology Comments: Plt 184   Cardiovascular:   Exercise tolerance: good    Pulmonary:   Asthma    OB/GYN/PEDS:  pregnant   Endocrine:  Endocrine Normal        Physical Exam  General:  Well nourished    Airway/Jaw/Neck:  Airway Findings: Mouth Opening: Normal Tongue: Normal     Eyes/Ears/Nose:  EYES/EARS/NOSE FINDINGS: Normal    Chest/Lungs:  Chest/Lungs Clear    Heart/Vascular:  Heart Findings: Normal       Mental Status:  Mental Status Findings: Normal        Anesthesia Plan  Type of Anesthesia, risks & benefits discussed:  Anesthesia Type:  CSE, spinal, epidural  Patient's Preference:   Intra-op Monitoring Plan:   Intra-op Monitoring Plan Comments:   Post Op Pain Control Plan:   Post Op Pain Control Plan Comments:   Induction:    Beta Blocker:         Informed Consent: Patient understands risks and agrees with Anesthesia plan.  Questions answered. Anesthesia consent signed with patient.  ASA Score: 3  emergent   Day of Surgery Review of History & Physical:            Ready For Surgery From Anesthesia Perspective.

## 2019-08-31 NOTE — NURSING
Dr perdomo notified of pt arrival and status per dr chou, who is on call. Will do r- c/s at 0230. Anesthesia, nursery notified abd clip, prep done. sce's on. Iv started, ivf bolus going for preop.

## 2019-08-31 NOTE — TRANSFER OF CARE
Anesthesia Transfer of Care Note    Patient: Kelly Norton    Procedure(s) Performed: Procedure(s) (LRB):   SECTION (N/A)    Patient location: Labor and Delivery    Anesthesia Type: spinal    Transport from OR: Transported from OR on room air with adequate spontaneous ventilation    Post pain: adequate analgesia    Post assessment: no apparent anesthetic complications    Post vital signs: stable    Level of consciousness: awake, alert and oriented    Nausea/Vomiting: no nausea/vomiting    Complications: none          Last vitals:   Visit Vitals  BP (!) 90/53   Pulse 67   LMP 2019   SpO2 100%

## 2019-09-01 LAB
BASOPHILS # BLD AUTO: 0.02 K/UL (ref 0–0.2)
BASOPHILS NFR BLD: 0.2 % (ref 0–1.9)
DIFFERENTIAL METHOD: ABNORMAL
EOSINOPHIL # BLD AUTO: 0.1 K/UL (ref 0–0.5)
EOSINOPHIL NFR BLD: 0.7 % (ref 0–8)
ERYTHROCYTE [DISTWIDTH] IN BLOOD BY AUTOMATED COUNT: 13.7 % (ref 11.5–14.5)
HCT VFR BLD AUTO: 30 % (ref 37–48.5)
HGB BLD-MCNC: 9.5 G/DL (ref 12–16)
INJECT RH IG VOL PATIENT: NORMAL ML
LYMPHOCYTES # BLD AUTO: 1.1 K/UL (ref 1–4.8)
LYMPHOCYTES NFR BLD: 13.3 % (ref 18–48)
MCH RBC QN AUTO: 27.2 PG (ref 27–31)
MCHC RBC AUTO-ENTMCNC: 31.7 G/DL (ref 32–36)
MCV RBC AUTO: 86 FL (ref 82–98)
MONOCYTES # BLD AUTO: 0.9 K/UL (ref 0.3–1)
MONOCYTES NFR BLD: 10.6 % (ref 4–15)
NEUTROPHILS # BLD AUTO: 6.2 K/UL (ref 1.8–7.7)
NEUTROPHILS NFR BLD: 75.2 % (ref 38–73)
PLATELET # BLD AUTO: 131 K/UL (ref 150–350)
PMV BLD AUTO: 11.9 FL (ref 9.2–12.9)
RBC # BLD AUTO: 3.49 M/UL (ref 4–5.4)
WBC # BLD AUTO: 8.29 K/UL (ref 3.9–12.7)

## 2019-09-01 PROCEDURE — 87340 HEPATITIS B SURFACE AG IA: CPT

## 2019-09-01 PROCEDURE — 25000003 PHARM REV CODE 250: Performed by: OBSTETRICS & GYNECOLOGY

## 2019-09-01 PROCEDURE — 11000001 HC ACUTE MED/SURG PRIVATE ROOM

## 2019-09-01 PROCEDURE — 85025 COMPLETE CBC W/AUTO DIFF WBC: CPT

## 2019-09-01 PROCEDURE — 63600519 RHOGAM PHARM REV CODE 636 ALT 250 W HCPCS: Performed by: OBSTETRICS & GYNECOLOGY

## 2019-09-01 PROCEDURE — 63600175 PHARM REV CODE 636 W HCPCS: Performed by: ANESTHESIOLOGY

## 2019-09-01 PROCEDURE — 86762 RUBELLA ANTIBODY: CPT

## 2019-09-01 PROCEDURE — 36415 COLL VENOUS BLD VENIPUNCTURE: CPT

## 2019-09-01 RX ADMIN — DOCUSATE SODIUM 200 MG: 100 CAPSULE, LIQUID FILLED ORAL at 08:09

## 2019-09-01 RX ADMIN — OXYCODONE HYDROCHLORIDE AND ACETAMINOPHEN 1 TABLET: 10; 325 TABLET ORAL at 12:09

## 2019-09-01 RX ADMIN — HUMAN RHO(D) IMMUNE GLOBULIN 300 MCG: 300 INJECTION, SOLUTION INTRAMUSCULAR at 04:09

## 2019-09-01 RX ADMIN — OXYCODONE HYDROCHLORIDE AND ACETAMINOPHEN 1 TABLET: 10; 325 TABLET ORAL at 04:09

## 2019-09-01 RX ADMIN — KETOROLAC TROMETHAMINE 30 MG: 30 INJECTION, SOLUTION INTRAMUSCULAR at 12:09

## 2019-09-01 RX ADMIN — NAPROXEN 500 MG: 500 TABLET ORAL at 09:09

## 2019-09-01 RX ADMIN — DIPHENHYDRAMINE HYDROCHLORIDE 25 MG: 25 CAPSULE ORAL at 07:09

## 2019-09-01 RX ADMIN — NAPROXEN 500 MG: 500 TABLET ORAL at 08:09

## 2019-09-01 RX ADMIN — OXYCODONE HYDROCHLORIDE AND ACETAMINOPHEN 1 TABLET: 10; 325 TABLET ORAL at 05:09

## 2019-09-01 RX ADMIN — MUPIROCIN 1 G: 20 OINTMENT TOPICAL at 09:09

## 2019-09-01 RX ADMIN — OXYCODONE HYDROCHLORIDE AND ACETAMINOPHEN 1 TABLET: 10; 325 TABLET ORAL at 09:09

## 2019-09-01 RX ADMIN — OXYCODONE HYDROCHLORIDE AND ACETAMINOPHEN 1 TABLET: 10; 325 TABLET ORAL at 08:09

## 2019-09-01 RX ADMIN — MUPIROCIN 1 G: 20 OINTMENT TOPICAL at 08:09

## 2019-09-01 NOTE — PLAN OF CARE
Plan of care reviewed with patient.  Pain controlled.  Tolerating reg diet. Passing gas.  Ambulating independently.  Assisted mom to visit with her NICU twins, emotional support offered.  Safety maintained.  Will cont to monitor.

## 2019-09-01 NOTE — PLAN OF CARE
Problem: Adult Inpatient Plan of Care  Goal: Plan of Care Review  Outcome: Ongoing (interventions implemented as appropriate)  POC reviewed with pt around 0735; verbalized acceptance and understanding.  Pt's VS stable.  Remains free from falls and injury.  Pain controlled with ordered meds.  Bonding well with babies; able to visit them in NICU throughout shift.  Family at bedside to offer support.  WCTM.     Rhogam given.  No hepatitis B or rubella labs on mom; notified MD on call.  Dr. Marquez gave telephone order to draw those labs on mom.  Lab results pending.

## 2019-09-01 NOTE — PROGRESS NOTES
POD#1  Post section, feels fine, up in chair in NICU holding baby  VSSAF  gen- no distress  abd- soft appropriately tender  Lochia moderate rubra  IMP  Post section, doing well  PLAN  routine

## 2019-09-02 LAB — HBV SURFACE AG SERPL QL IA: NEGATIVE

## 2019-09-02 PROCEDURE — 11000001 HC ACUTE MED/SURG PRIVATE ROOM

## 2019-09-02 PROCEDURE — 25000003 PHARM REV CODE 250: Performed by: OBSTETRICS & GYNECOLOGY

## 2019-09-02 RX ADMIN — SIMETHICONE CHEW TAB 80 MG 80 MG: 80 TABLET ORAL at 08:09

## 2019-09-02 RX ADMIN — NAPROXEN 500 MG: 500 TABLET ORAL at 10:09

## 2019-09-02 RX ADMIN — OXYCODONE HYDROCHLORIDE AND ACETAMINOPHEN 1 TABLET: 10; 325 TABLET ORAL at 12:09

## 2019-09-02 RX ADMIN — OXYCODONE AND ACETAMINOPHEN 1 TABLET: 5; 325 TABLET ORAL at 10:09

## 2019-09-02 RX ADMIN — OXYCODONE HYDROCHLORIDE AND ACETAMINOPHEN 1 TABLET: 10; 325 TABLET ORAL at 01:09

## 2019-09-02 RX ADMIN — MUPIROCIN 1 G: 20 OINTMENT TOPICAL at 08:09

## 2019-09-02 RX ADMIN — OXYCODONE HYDROCHLORIDE AND ACETAMINOPHEN 1 TABLET: 10; 325 TABLET ORAL at 05:09

## 2019-09-02 RX ADMIN — OXYCODONE HYDROCHLORIDE AND ACETAMINOPHEN 1 TABLET: 10; 325 TABLET ORAL at 04:09

## 2019-09-02 RX ADMIN — DOCUSATE SODIUM 200 MG: 100 CAPSULE, LIQUID FILLED ORAL at 08:09

## 2019-09-02 RX ADMIN — NAPROXEN 500 MG: 500 TABLET ORAL at 04:09

## 2019-09-02 RX ADMIN — NAPROXEN 500 MG: 500 TABLET ORAL at 01:09

## 2019-09-02 RX ADMIN — OXYCODONE HYDROCHLORIDE AND ACETAMINOPHEN 1 TABLET: 10; 325 TABLET ORAL at 08:09

## 2019-09-02 NOTE — PROGRESS NOTES
POD#2  Post section, doing well.   Some incisional pain  VSS AF  gen- no distress  abd-soft appr tender  Incision clean dry and intact, staples in place, some abdominal edema, and some areas of skin removal from bandage  HCT 37  IMP  Post section, doing well  PLAN  routine

## 2019-09-03 LAB
BASOPHILS # BLD AUTO: 0.01 K/UL (ref 0–0.2)
BASOPHILS NFR BLD: 0.1 % (ref 0–1.9)
DIFFERENTIAL METHOD: ABNORMAL
EOSINOPHIL # BLD AUTO: 0.2 K/UL (ref 0–0.5)
EOSINOPHIL NFR BLD: 1.3 % (ref 0–8)
ERYTHROCYTE [DISTWIDTH] IN BLOOD BY AUTOMATED COUNT: 13.7 % (ref 11.5–14.5)
HCT VFR BLD AUTO: 27.7 % (ref 37–48.5)
HGB BLD-MCNC: 9 G/DL (ref 12–16)
LYMPHOCYTES # BLD AUTO: 0.9 K/UL (ref 1–4.8)
LYMPHOCYTES NFR BLD: 6.9 % (ref 18–48)
MCH RBC QN AUTO: 28 PG (ref 27–31)
MCHC RBC AUTO-ENTMCNC: 32.5 G/DL (ref 32–36)
MCV RBC AUTO: 86 FL (ref 82–98)
MONOCYTES # BLD AUTO: 0.9 K/UL (ref 0.3–1)
MONOCYTES NFR BLD: 6.9 % (ref 4–15)
NEUTROPHILS # BLD AUTO: 11 K/UL (ref 1.8–7.7)
NEUTROPHILS NFR BLD: 84.8 % (ref 38–73)
PLATELET # BLD AUTO: 180 K/UL (ref 150–350)
PMV BLD AUTO: 12 FL (ref 9.2–12.9)
RBC # BLD AUTO: 3.22 M/UL (ref 4–5.4)
RUBV IGG SER-ACNC: 31.3 IU/ML
RUBV IGG SER-IMP: REACTIVE
WBC # BLD AUTO: 13.03 K/UL (ref 3.9–12.7)

## 2019-09-03 PROCEDURE — 85025 COMPLETE CBC W/AUTO DIFF WBC: CPT

## 2019-09-03 PROCEDURE — 36415 COLL VENOUS BLD VENIPUNCTURE: CPT

## 2019-09-03 PROCEDURE — 25000003 PHARM REV CODE 250: Performed by: OBSTETRICS & GYNECOLOGY

## 2019-09-03 PROCEDURE — 11000001 HC ACUTE MED/SURG PRIVATE ROOM

## 2019-09-03 RX ADMIN — DOCUSATE SODIUM 200 MG: 100 CAPSULE, LIQUID FILLED ORAL at 08:09

## 2019-09-03 RX ADMIN — OXYCODONE HYDROCHLORIDE AND ACETAMINOPHEN 1 TABLET: 10; 325 TABLET ORAL at 09:09

## 2019-09-03 RX ADMIN — OXYCODONE HYDROCHLORIDE AND ACETAMINOPHEN 1 TABLET: 10; 325 TABLET ORAL at 05:09

## 2019-09-03 RX ADMIN — OXYCODONE HYDROCHLORIDE AND ACETAMINOPHEN 1 TABLET: 10; 325 TABLET ORAL at 08:09

## 2019-09-03 RX ADMIN — DOCUSATE SODIUM 200 MG: 100 CAPSULE, LIQUID FILLED ORAL at 09:09

## 2019-09-03 RX ADMIN — MUPIROCIN 1 G: 20 OINTMENT TOPICAL at 09:09

## 2019-09-03 RX ADMIN — NAPROXEN 500 MG: 500 TABLET ORAL at 05:09

## 2019-09-03 RX ADMIN — OXYCODONE AND ACETAMINOPHEN 1 TABLET: 5; 325 TABLET ORAL at 02:09

## 2019-09-03 NOTE — OP NOTE
DATE OF PROCEDURE:  2019    PREOPERATIVE DIAGNOSIS:  A 34-week gestation, twins, previous  section   x2, active labor.    POSTOPERATIVE DIAGNOSIS:  A 34-week gestation, twins, previous  section   x2, active labor.    PROCEDURE:  Repeat low transverse  section.    SURGEON:  Silverio Devine M.D.    ASSISTANT:  Corrie Marquez M.D.    ANESTHESIA:  Spinal.    PROCEDURE IN DETAIL:  The patient was brought to the Operating Room, prepped and   draped in the usual fashion.  A Pfannenstiel skin incision was made over the   previous scar, incision brought down to fascia, fascia nicked in the midline and   extended bilaterally.  The muscle was dissected off the fascia bluntly and   sharply,  in midline, peritoneum was entered sharply.  Upon entering   the pelvis, there appeared to beginnings of separation of the previous uterine   scar.  The bladder was dissected off the uterus bluntly.  The uterus was entered   in a low transverse fashion and a female infant was delivered from breech   presentation without any complications.  Nose and mouth were suctioned, cord was   doubly clamped and cut, and the infant was handed off to  nurse   practitioner present.  The second twin was then delivered from breech   presentation without any complication.  Nose and mouth were suctioned, cord was   doubly clamped and cut, and infant was handed off to  nurse practitioner   present.  Placenta was delivered manually.  Uterus exteriorized, cleared of all   debris and the uterus was closed in a running interlocking fashion using 0   Vicryl suture.  Good hemostasis was noted.  The uterus was re-placed in the   pelvis.  Pelvis was irrigated with normal saline.  Incision was again examined,   appeared to be hemostatic.  Fascia closed in running fashion using #1 Vicryl   suture.  Skin was closed with staples.  There were no complications.  EBL for   the case 800 mL.  The patient tolerated the  procedure well and was taken to   Recovery in stable condition.        VG/HN  dd: 08/31/2019 10:00:47 (CDT)  td: 08/31/2019 15:06:33 (CDT)  Doc ID   #[id number]  Job ID #[id number]    CC:

## 2019-09-03 NOTE — ANESTHESIA POSTPROCEDURE EVALUATION
Anesthesia Post Evaluation    Patient: Kelly Norton    Procedure(s) Performed: Procedure(s) (LRB):   SECTION (N/A)    Final Anesthesia Type: spinal  Patient location during evaluation: floor  Patient participation: Yes- Able to Participate  Level of consciousness: awake and alert  Post-procedure vital signs: reviewed and stable  Pain management: adequate  Airway patency: patent  PONV status at discharge: No PONV  Anesthetic complications: no      Cardiovascular status: hemodynamically stable and blood pressure returned to baseline  Respiratory status: room air, spontaneous ventilation and unassisted  Hydration status: euvolemic  Follow-up not needed (full return of motor and sensory).          Vitals Value Taken Time   /73 9/3/2019  2:00 AM   Temp 37.3 °C (99.1 °F) 9/3/2019  2:00 AM   Pulse 104 9/3/2019  2:00 AM   Resp 16 9/3/2019  2:00 AM   SpO2 100 % 2019  2:05 PM         No case tracking events are documented in the log.      Pain/Alex Score: Pain Rating Prior to Med Admin: 6 (9/3/2019  2:15 AM)  Pain Rating Post Med Admin: 0 (9/3/2019  3:00 AM)

## 2019-09-03 NOTE — PHYSICIAN QUERY
PT Name: Kelly Norton  MR #: [id number]     Physician Query Form - Documentation Clarification      CDS/: ANGUS Mcmahon,[email or web address]         Contact information:[email or web address]    This form is a permanent document in the medical record.     Query Date: September 3, 2019    By submitting this query, we are merely seeking further clarification of documentation. Please utilize your independent clinical judgment when addressing the question(s) below.    The Medical record reflects the following:    Supporting Clinical Findings Location in Medical Record   POSTOPERATIVE DIAGNOSIS:  A 34-week gestation, twins, previous  section   x2, active labor.     PROCEDURE:  Repeat low transverse  section.     The uterus was entered   in a low transverse fashion and a female infant was delivered from breech presentation without any complications.  Nose and mouth were suctioned, cord was doubly clamped and cut, and the infant was handed off to  nurse   practitioner present.  The second twin was then delivered from breech   presentation without any complication.  Nose and mouth were suctioned, cord was doubly clamped and cut, and infant was handed off to  nurse practitioner present.  Placenta was delivered manually. Op note 9/3                                                                                  Doctor, Please specify diagnosis or diagnoses associated with above clinical findings.    Please specify type of twins.    Provider Use Only    [  ] Monochorionic and diamniotic  [ x ] Dichorionic and diamniotic  [  ] Other, please specify:__________________________________                                                                                                               [  ] Clinically Undetermined

## 2019-09-04 VITALS
OXYGEN SATURATION: 100 % | RESPIRATION RATE: 18 BRPM | DIASTOLIC BLOOD PRESSURE: 82 MMHG | TEMPERATURE: 97 F | HEART RATE: 105 BPM | SYSTOLIC BLOOD PRESSURE: 144 MMHG

## 2019-09-04 PROCEDURE — 25000003 PHARM REV CODE 250: Performed by: OBSTETRICS & GYNECOLOGY

## 2019-09-04 RX ADMIN — OXYCODONE HYDROCHLORIDE AND ACETAMINOPHEN 1 TABLET: 10; 325 TABLET ORAL at 05:09

## 2019-09-04 RX ADMIN — MUPIROCIN 1 G: 20 OINTMENT TOPICAL at 08:09

## 2019-09-04 RX ADMIN — DOCUSATE SODIUM 200 MG: 100 CAPSULE, LIQUID FILLED ORAL at 08:09

## 2019-09-04 RX ADMIN — OXYCODONE HYDROCHLORIDE AND ACETAMINOPHEN 1 TABLET: 10; 325 TABLET ORAL at 01:09

## 2019-09-04 RX ADMIN — NAPROXEN 500 MG: 500 TABLET ORAL at 01:09

## 2019-09-04 NOTE — PROGRESS NOTES
Delivery Progress Note  Obstetrics        SUBJECTIVE:     Postpartum Day 4:  Delivery    Ms. Norton states she feels well. She denies emotional concerns. Her pain is well controlled with current medications. The babies are well. The babies are feeding via bottleThe patient is ambulating well. Ms. Norton is tolerating a normal diet. Flatus has been passed. Urinary output is adequate.    OBJECTIVE:     Vital Signs (Most Recent):  Temp: 97.4 °F (36.3 °C) (19)  Pulse: 105 (19)  Resp: 18 (19)  BP: (!) 144/82 (19)  SpO2: 100 % (19)    Vital Signs Range (Last 24H):  Temp:  [97.4 °F (36.3 °C)-99.1 °F (37.3 °C)]   Pulse:  []   Resp:  [16-18]   BP: (112-144)/(57-82)   SpO2:  [99 %-100 %]     I & O (Last 24H):No intake or output data in the 24 hours ending 19  Physical Exam:  General:    no distress   Lungs:  clear to auscultation bilaterally   Heart:  regular rate and rhythm, S1, S2 normal, no murmur, click, rub or gallop   Breasts:  no discharge, erythema, or tenderness   Abdomen:  soft, non-tender; bowel sounds normal   Fundus:  firm   Incision:  healing well   Lochia:   scant rubra   DVT Evaluation:  No evidence of DVT on either side seen on physical exam.     Hemoglobin/Hematocrit  Recent Labs   Lab 19  1907   HGB 9.0*   HCT 27.7*     ABO/Rh  Lab Results   Component Value Date    GROUPTRH A NEG 2019     Rubella  No results for input(s): RUBELLAIGGSC in the last 168 hours.    ASSESSMENT/PLAN:     Status post  section. Doing well postoperatively.     Discharge home with standard precautions and return to clinic in 4-6 weeks.

## 2019-09-04 NOTE — LACTATION NOTE
[facility]  Lactation Note - Mom    SUMMARY     Maternal Assessment    Breast Density: Bilateral:, filling  Areola: Bilateral:, elastic  Nipples: Bilateral:, everted  Left Nipple Symptoms: leaking (lactating)  Right Nipple Symptoms: leaking (lactating)  Preferred Pain Scale: number (Numeric Rating Pain Scale)  Comfort/Acceptable Pain Level: 3  Pain Body Location - Side: Bilateral  Pain Body Location - Orientation: incisional  Pain Body Location: abdomen  Pain Rating (0-10): Rest: 0  Pain Rating (0-10): Activity: 0  Pain Rating: Rest: 6 - moderate-severe pain  Pain Rating: Activity: 6 - moderate-severe pain  Pain Radiation to: abdomen  Frequency: intermittent  Quality: aching  Pain Management Interventions: care clustered, pain management plan reviewed with patient/caregiver, quiet environment facilitated, relaxation techniques promoted  Sleep/Rest/Relaxation: awake  POSS (Pasero Opioid-Induced Sed Scale): 1 - Awake and alert  Fever Reduction/Comfort Measures: medication administered    LATCH Score         Breasts WDL    Breast WDL: WDL  Left Nipple Symptoms: leaking (lactating)  Right Nipple Symptoms: leaking (lactating)    Maternal Infant Feeding    Maternal Preparation: breast care, hand hygiene  Maternal Emotional State: relaxed, independent(denies need for assist w/pumping)  Pain with Feeding: no(w/ pumping)    Lactation Referrals    Lactation Referrals: WIC (women, infants and children) program  WIC Lactation Follow-up Date/Time: 9/4/19 by 1530 to get breast pump from [facility]    Lactation Interventions    Breast Care: Breastfeeding: breast milk to nipples, lanolin to nipples  Breast Care: Breastfeeding: breast milk to nipples, lanolin to nipples  Breastfeeding Support: diary/feeding log utilized, encouragement provided, lactation counseling provided, maternal hydration promoted, maternal nutrition promoted, maternal rest encouraged       Breastfeeding Session    Breast Pumping  Interventions: frequent pumping encouraged    Maternal Information

## 2019-09-04 NOTE — PLAN OF CARE
Introduced patient to breast pump, as requested.  Patient educated on hand hygiene, how to set up, clean and dry pump parts, how to store breast milk, hand expression to relieve engorgement and encouraged to hand express 5 mins after using breast pump, and labeling breast milk.  NICU breastfeeding guide given to patient.  Patient encouraged to ask questions and support given.  Encouraged to pump 8 or more times in 24 hours.  Patient pumped 2.5 oz in 20 mins followed by hand expression.  Will cont to monitor and assist with breast pumping.

## 2019-09-04 NOTE — LACTATION NOTE
WIC info to mom-copies of NICU letter & WIC 17 forms given. Instructed to call Marya at [facility] when on way as requested. Larger colostrum containers provided. Just pumped 60 ml colostrum per mom. Lots of praise & encouragement provided.

## 2019-09-04 NOTE — DISCHARGE INSTRUCTIONS
"Patient Discharge Instructions for Postpartum Women    Resume Regular Diet  Increase activity gradually, no heavy lifting  Shower  No tampons, douching or sexual intercourse.  Discuss birth control options with your physician.  Wear a support bra  Return to work/school when you've been cleared by a physician    Call your physician if     *Fever of 100.4 or higher  *Persistent nausea/ vomiting  *Incisional drainage  *Heavy vaginal bleeding or large clots (Heavy bleeding is soaking 1 pad in an hour)  *Swelling and pain in arms or legs  *Severe headaches, blurred vision or fainting  *Shortness of breath  *Frequency and burning with urination  *Signs of postpartum depression, discuss these signs with your physician    Call lactation services for questions regarding feeding, nipple and breast care, and general questions about lactation.  They can be reached at [phone]         Understanding Postpartum Depression    You've just had a baby.  You know you should be excited and happy.  But instead you find yourself crying for no reason.  You may have trouble coping with your daily tasks.  You feel sad, tired, and hopeless most of the time.  You may even feel ashamed or guilty.  But what you're going through is not your fault and you can feel better.  Talk to your doctor.  He or she can help.    Depression After Childbirth    You may be weepy and tired right after giving birth.  These feelings are normal.  They're sometimes called the "baby blues."  These blues go away 2-3 weeks.  However, postpartum (meaning "after birth") depression lasts much longer and is more sever than the "baby blues."  It can make you feel sad and hopeless.  You may also fear that your baby will be harmed and worry about being a bad mother.      What is Depression?    Depression is a mood disorder that affects the way you think and feel.  The most common symptom is a feeling of deep sadness.  You may also feel as if you just can't cope with life.  "   Other symptoms include:      * Gaining or loosing weight  * Sleeping too much or too little  * Feeling tired all the time  * Feeling restless  * Fears of harming your baby   * Lack of interest in your baby  * Feeling worthless or guilty  * No longer finding pleasure in things you used to  * Having trouble thinking clearly or making decisions  * Thoughts of hurting yourself or your baby    What Causes Postpartum Depression    The exact causes of postpartum depression isn't known.  It may be due to changes in your hormones during and after childbirth.  You may also be tired from caring for your baby and adjusting to being a mother.  All these factors may make you feel depressed.  In some cases, your genes may also play a role.    Depression Can Be Treated    The good news is that there are many ways to treat postpartum depression.  Talking to your doctor is the first step toward feeling better.    Resources:    * National [place] of Mental Health  -- 137.118.9313    www.nimh.nih.gov    * National [place] on Mental Illness --312.406.2641    Www.cindy.org    * Mental Health Liv -- 849.362.3881     Www.[facility].org    * National Suicide Hotline --185.294.6532 (800-SUICIDE)    2649-5523 The [employer]  All rights reserved.  This information is not intended as a substitute for professional medical care.  Always follow up with your healthcare professional's instructions.      Pumping for the Baby in NICU    Preparation and Hygiene:  Shower daily.  Wear a clean bra each day and wash daily in warm soapy water.  1. Change wet or moist breast pads frequently.  Moist pads can promote growth of germs.  2. Actively wash your hands, paying close attention to the area around and under your fingernails, thoroughly with soap and water for 15 seconds before pumping or handling your milk.  Re-wash your hands if you touch anything (scratching your nose, answering the phone, etc) while pumping or handling your milk.    3. Before pumping your breasts, assemble the pump collection kit and have ready the sterile container and labels.  Place these items on a clean surface next to the breast pump.  4. Each time after you have finished pumping, take apart all of the parts of the breast pump collection kit and place them in a separate cleaning container (do not place them in the sink).  Be sure to remove the yellow valve from the breast shield and separate the white membrane from the yellow valve.  Rinse all of these parts with cool water.  Then use a new sponge and/or bottle brush and dishwashing detergent to clean the parts.  Rinse off the soapy water with cool water and air dry on a clean towel covered with a clean cloth.  All parts may also be washed after each use in the top rack of a .  5. Once each day, sanitize all of the parts of the breast pump collection kit.  This can be done by boiling the kit parts for 10 minutes or by using a Quick Clean Micro-Steam Bag made by [employer]  6. If condensation appears in the tubing, continue to run the pump with the tubing attached for 1-2 minutes or until the tubing is dry.   7. Notify your babys nurse or doctor if you become ill or need to take any medication, even over-the-counter medicines.  Collection and Storage of Expressed Breast milk:       1. Pump your breasts at least 8-10 times every 24 hours.  Double pump (both breasts at the same time) for at least 15-20 minutes using the most suction that is comfortable.    2. Write the date and time of pumping and the name of any medications you are taking on the babys pre-printed hospital identification label.   3. Place your babys pre-printed hospital identification label on each container of breast milk.  Additional pre-printed labels can be obtained from your babys nurse.  If your expressed breast milk is not correctly labeled, the nurse cannot feed the milk to the baby.       4.    Do not touch the inside of the storage  containers or lids.  5.        Pour the amount of expressed milk needed for 1 of your babys feedings into each storage container.  Use a new container(s) for each pumping.  Additional storage containers can be obtained from your babys nurse.  6. Tightly screw the lid onto the container and place immediately into the refrigerator for daily transportation to the hospital.   Do not freeze your milk unless asked to do so by your babys nurse.  However, if you are not able to visit your baby each day, place the expressed breast milk in the freezer.  7.     Expressed breast milk should be refrigerated or frozen within 4 hours of pumping.  8.         Do not store expressed breast milk on the door of your refrigerator or freezer where the temperature is warmer.   Transportation of Expressed Breast milk:  1. Refrigerated breast milk or frozen milk should be packed tightly together in a cooler with frozen, gel-packs to keep the milk frozen.  DO NOT USE ICE CUBES (WET ICE) TO TRANSPORT FROZEN MILK.   A clean towel can be used to fill any extra space between containers of frozen milk.  2.    Bring your expressed milk from home each time you visit the baby.

## 2019-09-04 NOTE — PHYSICIAN QUERY
"PT Name: Kelly Norton  MR #: [id number]    Physician Query Form - Hematology Clarification      CDS/: ANGUS Mcmahon,RNC-MNN       Contact information:[email or web address]    This form is a permanent document in the medical record.      Query Date: 2019    By submitting this query, we are merely seeking further clarification of documentation. Please utilize your independent clinical judgment when addressing the question(s) below.    The Medical record contains the following:   Indicators  Supporting Clinical Findings Location in Medical Record    "Anemia" documented     X H & H = Hgb=9.0-12.1  Hct=27.7-37.7 LAB -9/3    BP =                     HR=      "GI bleeding" documented     X Acute bleeding (Non GI site) EBL for   the case 800 mL Op note     Transfusion(s)      Treatment:     X Other:  PROCEDURE:  Repeat low transverse  section Op note      Provider, please specify diagnosis or diagnoses associated with above clinical findings.    [  ] Acute blood loss anemia expected post-operatively   [ x ] Acute blood loss anemia     [  ] Other Hematological Diagnosis (please specify):     [  ] Clinically Undetermined       Please document in your progress notes daily for the duration of treatment, until resolved, and include in your discharge summary.                                                                                                      "

## 2019-09-04 NOTE — NURSING
Staples removed from LTV abdominal incision and steristrips placed.  Wound edges well approximated .  No bleeding or oozing from incision.  Pt tolerated well

## 2019-09-04 NOTE — H&P
IUP at 34 wks with twins and previous cs in active labor  See prenatal record, no change in history and physical    IUP at 34 wks with twins in labor  For cs

## 2019-09-04 NOTE — L&D DELIVERY NOTE
Errol, A Girl Kelly SHAH [[id number]]     Delivery Information for A Leida Norton    Birth information:  YOB: 2017   Time of birth: 3:00 AM   Sex: female   Head Delivery Date/Time: 2019  3:00 AM   Delivery type: , Low Transverse   Gestational Age: 34w4d    Delivery Providers    Delivering clinician:  Silverio Devine MD   Provider Role    Louise G. Collins, MD Lashon C. Fahrney,      DAV Foley NNP Michelle M Wilson, RN Rommel Olalde, DAV Stevenson RN             Measurements    Weight:  2194 g  Length:           Apgars    Living status:  Living  Apgars:   1 min.:   5 min.:   10 min.:   15 min.:   20 min.:     Skin color:   0  1       Heart rate:   2  2       Reflex irritability:   2  2       Muscle tone:   2  2       Respiratory effort:   2  2       Total:   8  9       Apgars assigned by:  KERON LEVINE         Operative Delivery    Forceps attempted?:  No  Vacuum extractor attempted?:  No         Shoulder Dystocia    Shoulder dystocia present?:  No           Presentation    Presentation:  Justyn Breech  Position:  Middle Sacrum Anterior           Interventions/Resuscitation    Method:  Bulb Suctioning, Tactile Stimulation       Cord    Vessels:  3 vessels  Complications:  None  Delayed Cord Clamping?:  No  Cord Blood Disposition:  Lab  Gases Sent?:  No  Stem Cell Collection (by MD):  No       Placenta    Placenta delivery date/time:  2019 0302  Placenta removal:  Manual removal  Placenta appearance:  Intact  Placenta disposition:  pathology           Labor Events:       labor: Yes     Labor Onset Date/Time:         Dilation Complete Date/Time:         Start Pushing Date/Time:       Rupture Date/Time:              Rupture type:           Fluid Amount:        Fluid Color:        Fluid Odor:        Membrane Status (PeriCalm): SRM (Spontaneous Rupture)      Rupture Date/Time (PeriCalm): 2019 23:30:00      Fluid  Amount (PeriCalm): Moderate      Fluid Color (PeriCalm): Clear       steroids: None     Antibiotics given for GBS: No     Induction: none     Indications for induction:        Augmentation:       Indications for augmentation:       Labor complications: None     Additional complications:          Cervical ripening:                     Delivery:      Episiotomy: None     Indication for Episiotomy:       Perineal Lacerations: None Repaired:      Periurethral Laceration:   Repaired:     Labial Laceration:   Repaired:     Sulcus Laceration:   Repaired:     Vaginal Laceration:   Repaired:     Cervical Laceration:   Repaired:     Repair suture:       Repair # of packets:       Last Value - EBL - Nursing (mL):       Sum - EBL - Nursing (mL): 0     Last Value - EBL - Anesthesia (mL):      Calculated QBL (mL): 1016      Vaginal Sweep Performed:       Surgicount Correct:         Other providers:       Anesthesia    Method:  Spinal          Details (if applicable):  Trial of Labor No    Categorization: Repeat    Priority: Emergency   Indications for : Repeat Section;Breech   Incision Type: low transverse     Additional  information:  Forceps:    Vacuum:    Breech:    Observed anomalies    Other (Comments):            EDWIN Norton Leida SHAH [[id number]]     Delivery Information for EDWIN Norton    Birth information:  YOB: 2017   Time of birth: 3:01 AM   Sex: female   Head Delivery Date/Time: 2019  3:01 AM   Delivery type: , Low Transverse   Gestational Age: 34w4d    Delivery Providers    Delivering clinician:  Silverio Devine MD   Provider Role    Stacey Cathey, RN William Chambers, RN Lashon C. Fahrney, ST Michelle M Wilson, RN Louise G. Collins, MD Pamela Trahan, LUPILLO Deshpande RN             Measurements    Weight:  2590 g  Length:           Apgars    Living status:  Living  Apgars:   1 min.:   5 min.:   10 min.:   15  min.:   20 min.:     Skin color:   0  0       Heart rate:   2  2       Reflex irritability:   1  2       Muscle tone:   1  2       Respiratory effort:   2  2       Total:   6  8              Operative Delivery    Forceps attempted?:  No  Vacuum extractor attempted?:  No         Shoulder Dystocia    Shoulder dystocia present?:  No           Presentation    Presentation:  Justyn Breech  Position:  Middle Sacrum Anterior           Interventions/Resuscitation    Method:  Bulb Suctioning, Tactile Stimulation, PPV       Cord    Vessels:  3 vessels  Complications:  None  Delayed Cord Clamping?:  No  Cord Blood Disposition:  Lab, Sent with Baby  Gases Sent?:  No  Stem Cell Collection (by MD):  No       Placenta    Placenta delivery date/time:  2019 0302  Placenta removal:  Manual removal  Placenta appearance:  Intact  Placenta disposition:  pathology           Labor Events:       labor: Yes     Labor Onset Date/Time:         Dilation Complete Date/Time:         Start Pushing Date/Time:       Rupture Date/Time:              Rupture type:           Fluid Amount:        Fluid Color:        Fluid Odor:        Membrane Status (PeriCalm): SRM (Spontaneous Rupture)      Rupture Date/Time (PeriCalm): 2019 23:30:00      Fluid Amount (PeriCalm): Moderate      Fluid Color (PeriCalm): Clear       steroids: None     Antibiotics given for GBS: No     Induction:       Indications for induction:        Augmentation:       Indications for augmentation:       Labor complications: None     Additional complications:          Cervical ripening:                     Delivery:      Episiotomy: None     Indication for Episiotomy:       Perineal Lacerations: None Repaired:      Periurethral Laceration:   Repaired:     Labial Laceration:   Repaired:     Sulcus Laceration:   Repaired:     Vaginal Laceration:   Repaired:     Cervical Laceration:   Repaired:     Repair suture:       Repair # of packets:       Last Value - EBL -  Nursing (mL):       Sum - EBL - Nursing (mL): 0     Last Value - EBL - Anesthesia (mL):      Calculated QBL (mL): 1016      Vaginal Sweep Performed:       Surgicount Correct:         Other providers:       Anesthesia    Method:  Spinal          Details (if applicable):  Trial of Labor No    Categorization: Repeat    Priority: Emergency   Indications for : Repeat Section;Breech   Incision Type: low transverse     Additional  information:  Forceps:    Vacuum:    Breech:    Observed anomalies    Other (Comments):

## 2019-09-04 NOTE — PLAN OF CARE
Problem: Adult Inpatient Plan of Care  Goal: Plan of Care Review  Outcome: Outcome(s) achieved Date Met: 19  Mom will continue to pump/hand express at least 8+ times/24 hrs for  nicu babies. Symphony pump at bs. Reviewed use/cleaning. Stressed importance of hand hygiene & keeping pump kit clean. Will collect, label, store & transport EBM as instructed. Discussed need for pump at home. Offered pump rental but does not have a credit card. Discussed getting pump through insurance/WIC. Called [facility]-spoke to Marya-mom to get personal pump today on way home. Will call for any needs.

## 2019-09-04 NOTE — DISCHARGE SUMMARY
[facility]  Obstetrics  Discharge Summary      Patient Name: Kelly Norton  MRN: [id number]  Admission Date: 2019  Hospital Length of Stay: 4 days  Discharge Date and Time: No discharge date for patient encounter.  Attending Physician: Silverio Devine MD   Discharging Provider: Silverio Devine MD   Primary Care Provider: Romelia Valentin MD    HPI: No notes on file        Procedure(s) (LRB):   SECTION (N/A)     Hospital Course:   No notes on file     Consults (From admission, onward)        Status Ordering Provider     Inpatient consult to Anesthesiology  Once     Provider:  (Not yet assigned)    Acknowledged JACK CHAVEZ     Inpatient consult to Lactation  Once     Provider:  (Not yet assigned)    Acknowledged SILVERIO DEVINE          Final Active Diagnoses:    Diagnosis Date Noted POA    PRINCIPAL PROBLEM:   delivery delivered [O82] 2019 No    Amniotic fluid leaking [O42.90] 2019 Yes      Problems Resolved During this Admission:            Feeding Method: bottle    Immunizations     Date Immunization Status Dose Route/Site Given by    19 1611 Rho (D) Immune Globulin - IM Given 300 mcg Intramuscular/Right Ventrogluteal Nora Sanchez RN    19 1424 MMR Deferred 0 mL Subcutaneous/Left deltoid Emmanuelle Valdes RN    19 0443 Tdap Incomplete 0.5 mL Intramuscular/Left deltoid              NOLBERTO Norton Girl Kelly SHAH [[id number]]     Delivery:    Episiotomy: None   Lacerations: None   Repair suture:     Repair # of packets:     Blood loss (ml):       Birth information:  YOB: 2017   Time of birth: 3:00 AM   Sex: female   Delivery type: , Low Transverse   Gestational Age: 34w4d    Delivery Clinician:      Other providers:       Additional  information:  Forceps:    Vacuum:    Breech:    Observed anomalies      Living?:           APGARS  One minute Five minutes Ten minutes   Skin color:         Heart rate:         Grimace:         Muscle  tone:         Breathing:         Totals: 8  9        Placenta: Delivered:       appearance     EDWIN Norton Girl Kelly SHAH [[id number]]     Delivery:    Episiotomy: None   Lacerations: None   Repair suture:     Repair # of packets:     Blood loss (ml):       Birth information:  YOB: 2017   Time of birth: 3:01 AM   Sex: female   Delivery type: , Low Transverse   Gestational Age: 34w4d    Delivery Clinician:      Other providers:       Additional  information:  Forceps:    Vacuum:    Breech:    Observed anomalies      Living?:           APGARS  One minute Five minutes Ten minutes   Skin color:         Heart rate:         Grimace:         Muscle tone:         Breathing:         Totals: 6  8        Placenta: Delivered:       appearance    Pending Diagnostic Studies:     Procedure Component Value Units Date/Time    Specimen to Pathology - Surgery [[id number]] Collected:  191    Order Status:  Sent Lab Status:  In process Updated:  19    Specimen:  Tissue           Discharged Condition: good    Disposition: Home or Self Care    Follow Up:  Follow-up Information     Silverio Devine MD. Schedule an appointment as soon as possible for a visit in 1 week.    Specialty:  Obstetrics  Why:  Follow-up, For wound re-check  Contact information:  [address] [place] [id number] [phone]             Silverio Devine MD In 3 weeks.    Specialty:  Obstetrics  Contact information:  [address] [place] [id number] [phone]                 Patient Instructions:   No discharge procedures on file.  Medications:  Current Discharge Medication List      CONTINUE these medications which have NOT CHANGED    Details   acetaminophen (TYLENOL) 500 MG tablet Take 1 tablet (500 mg total) by mouth every 6 (six) hours as needed for Pain.  Qty: 28 tablet, Refills: 0      HYDROcodone-acetaminophen (NORCO) 5-325 mg per tablet Take 1 tablet by mouth every 6 (six) hours as needed for Pain.  Qty: 110  tablet, Refills: 0      PNV no.95/ferrous fum/folic ac (PRENATAL ORAL) Take by mouth.      promethazine HCl (PROMETHAZINE ORAL) Take by mouth.             Silverio Devine MD  Obstetrics  [facility]

## 2019-09-06 ENCOUNTER — HOSPITAL ENCOUNTER (OUTPATIENT)
Facility: HOSPITAL | Age: 29
Discharge: HOME | End: 2019-09-07
Attending: OBSTETRICS & GYNECOLOGY | Admitting: OBSTETRICS & GYNECOLOGY
Payer: MEDICAID

## 2019-09-06 ENCOUNTER — ANESTHESIA EVENT (OUTPATIENT)
Dept: OBSTETRICS AND GYNECOLOGY | Facility: HOSPITAL | Age: 29
End: 2019-09-06
Payer: MEDICAID

## 2019-09-06 ENCOUNTER — ANESTHESIA (OUTPATIENT)
Dept: OBSTETRICS AND GYNECOLOGY | Facility: HOSPITAL | Age: 29
End: 2019-09-06
Payer: MEDICAID

## 2019-09-06 DIAGNOSIS — R51.9 HEAD ACHE: ICD-10-CM

## 2019-09-06 LAB
ALBUMIN SERPL BCP-MCNC: 2 G/DL (ref 3.5–5.2)
ALP SERPL-CCNC: 181 U/L (ref 55–135)
ALT SERPL W/O P-5'-P-CCNC: 31 U/L (ref 10–44)
ANION GAP SERPL CALC-SCNC: 10 MMOL/L (ref 8–16)
APTT BLDCRRT: 30.2 SEC (ref 21–32)
AST SERPL-CCNC: 28 U/L (ref 10–40)
BACTERIA #/AREA URNS HPF: ABNORMAL /HPF
BASOPHILS # BLD AUTO: 0.03 K/UL (ref 0–0.2)
BASOPHILS NFR BLD: 0.4 % (ref 0–1.9)
BILIRUB SERPL-MCNC: 0.3 MG/DL (ref 0.1–1)
BILIRUB UR QL STRIP: NEGATIVE
BUN SERPL-MCNC: 10 MG/DL (ref 6–20)
CALCIUM SERPL-MCNC: 8.3 MG/DL (ref 8.7–10.5)
CHLORIDE SERPL-SCNC: 109 MMOL/L (ref 95–110)
CLARITY UR: CLEAR
CO2 SERPL-SCNC: 21 MMOL/L (ref 23–29)
COLOR UR: YELLOW
CREAT SERPL-MCNC: 0.7 MG/DL (ref 0.5–1.4)
CREAT UR-MCNC: 258.3 MG/DL (ref 15–325)
DIFFERENTIAL METHOD: ABNORMAL
EOSINOPHIL # BLD AUTO: 0.1 K/UL (ref 0–0.5)
EOSINOPHIL NFR BLD: 1.3 % (ref 0–8)
ERYTHROCYTE [DISTWIDTH] IN BLOOD BY AUTOMATED COUNT: 13.8 % (ref 11.5–14.5)
EST. GFR  (AFRICAN AMERICAN): >60 ML/MIN/1.73 M^2
EST. GFR  (NON AFRICAN AMERICAN): >60 ML/MIN/1.73 M^2
GLUCOSE SERPL-MCNC: 89 MG/DL (ref 70–110)
GLUCOSE UR QL STRIP: NEGATIVE
HCT VFR BLD AUTO: 29.5 % (ref 37–48.5)
HGB BLD-MCNC: 8.8 G/DL (ref 12–16)
HGB UR QL STRIP: ABNORMAL
INR PPP: 1.5 (ref 0.8–1.2)
KETONES UR QL STRIP: NEGATIVE
LEUKOCYTE ESTERASE UR QL STRIP: ABNORMAL
LYMPHOCYTES # BLD AUTO: 1.1 K/UL (ref 1–4.8)
LYMPHOCYTES NFR BLD: 14.3 % (ref 18–48)
MCH RBC QN AUTO: 26.7 PG (ref 27–31)
MCHC RBC AUTO-ENTMCNC: 29.8 G/DL (ref 32–36)
MCV RBC AUTO: 89 FL (ref 82–98)
MICROSCOPIC COMMENT: ABNORMAL
MONOCYTES # BLD AUTO: 0.7 K/UL (ref 0.3–1)
MONOCYTES NFR BLD: 9 % (ref 4–15)
NEUTROPHILS # BLD AUTO: 5.9 K/UL (ref 1.8–7.7)
NEUTROPHILS NFR BLD: 75 % (ref 38–73)
NITRITE UR QL STRIP: NEGATIVE
PH UR STRIP: 7 [PH] (ref 5–8)
PLATELET # BLD AUTO: 160 K/UL (ref 150–350)
PMV BLD AUTO: 11 FL (ref 9.2–12.9)
POTASSIUM SERPL-SCNC: 3.3 MMOL/L (ref 3.5–5.1)
PROT SERPL-MCNC: 6.3 G/DL (ref 6–8.4)
PROT UR QL STRIP: ABNORMAL
PROT UR-MCNC: 25 MG/DL (ref 0–15)
PROT/CREAT UR: 0.1 MG/G{CREAT} (ref 0–0.2)
PROTHROMBIN TIME: 15.5 SEC (ref 9–12.5)
RBC # BLD AUTO: 3.3 M/UL (ref 4–5.4)
RBC #/AREA URNS HPF: 50 /HPF (ref 0–4)
SODIUM SERPL-SCNC: 140 MMOL/L (ref 136–145)
SP GR UR STRIP: 1.01 (ref 1–1.03)
SQUAMOUS #/AREA URNS HPF: 2 /HPF
URN SPEC COLLECT METH UR: ABNORMAL
UROBILINOGEN UR STRIP-ACNC: 1 EU/DL
WBC # BLD AUTO: 7.96 K/UL (ref 3.9–12.7)
WBC #/AREA URNS HPF: 30 /HPF (ref 0–5)
WBC CLUMPS URNS QL MICRO: ABNORMAL

## 2019-09-06 PROCEDURE — 63600175 PHARM REV CODE 636 W HCPCS: Performed by: SPECIALIST

## 2019-09-06 PROCEDURE — 85610 PROTHROMBIN TIME: CPT

## 2019-09-06 PROCEDURE — 25000003 PHARM REV CODE 250: Performed by: SPECIALIST

## 2019-09-06 PROCEDURE — 82570 ASSAY OF URINE CREATININE: CPT

## 2019-09-06 PROCEDURE — 85025 COMPLETE CBC W/AUTO DIFF WBC: CPT

## 2019-09-06 PROCEDURE — 36415 COLL VENOUS BLD VENIPUNCTURE: CPT

## 2019-09-06 PROCEDURE — G0378 HOSPITAL OBSERVATION PER HR: HCPCS

## 2019-09-06 PROCEDURE — 81000 URINALYSIS NONAUTO W/SCOPE: CPT

## 2019-09-06 PROCEDURE — 85730 THROMBOPLASTIN TIME PARTIAL: CPT

## 2019-09-06 PROCEDURE — 80053 COMPREHEN METABOLIC PANEL: CPT

## 2019-09-06 PROCEDURE — 87086 URINE CULTURE/COLONY COUNT: CPT

## 2019-09-06 PROCEDURE — 62273 INJECT EPIDURAL PATCH: CPT

## 2019-09-06 RX ORDER — ACETAMINOPHEN 500 MG
1000 TABLET ORAL EVERY 6 HOURS PRN
Status: DISCONTINUED | OUTPATIENT
Start: 2019-09-06 | End: 2019-09-07 | Stop reason: HOSPADM

## 2019-09-06 RX ORDER — SODIUM CHLORIDE, SODIUM LACTATE, POTASSIUM CHLORIDE, CALCIUM CHLORIDE 600; 310; 30; 20 MG/100ML; MG/100ML; MG/100ML; MG/100ML
INJECTION, SOLUTION INTRAVENOUS CONTINUOUS
Status: DISCONTINUED | OUTPATIENT
Start: 2019-09-06 | End: 2019-09-07 | Stop reason: HOSPADM

## 2019-09-06 RX ORDER — OXYCODONE AND ACETAMINOPHEN 7.5; 325 MG/1; MG/1
1 TABLET ORAL EVERY 4 HOURS PRN
COMMUNITY

## 2019-09-06 RX ADMIN — SODIUM CHLORIDE, SODIUM LACTATE, POTASSIUM CHLORIDE, AND CALCIUM CHLORIDE: .6; .31; .03; .02 INJECTION, SOLUTION INTRAVENOUS at 09:09

## 2019-09-06 RX ADMIN — DEXTROSE 2 G: 50 INJECTION, SOLUTION INTRAVENOUS at 09:09

## 2019-09-06 RX ADMIN — ACETAMINOPHEN 1000 MG: 500 TABLET ORAL at 09:09

## 2019-09-06 NOTE — NURSING
Called  dr Devine office  Service    Report  Pt name  #  Ha 10-10 pitting edema +3 sob  Del on 8/31  Awaiting  Return call..

## 2019-09-06 NOTE — H&P
History and Physical, Observation note  28 year old  with severe headache and shortness of breath for a few days.   She delivered twin girls by  section 6 days ago.  She did not have elevated pressures while or after delivery.    ROS persistent headache , positive for floaters in eyesight, no anuria, some shortness of breath  with exertion, no heavy vaginal bleeding, positive for photophobia    PMH none  PSURG  section times 3  NKDA  MEDS percocet  OBH- section times 3    VSSAF  O2 sat 98%  gen- no distress  CV RRR with II/VI systolic murmur  Chest - clear with good air movement, no rales or rhonchi  abd- soft obese, incision clean dry and intact, some moderate edema of pannus  Ext- 3+ pitting edema, DTR's +1    LABS- pending    IMP  Postpartum headache, edema and shortness of breath    PLAN  CBC CMP PT PTT FIBR, UA CXR,   Anesthesia consult for possible spinal headache  Discussed with patient and her family

## 2019-09-06 NOTE — NURSING
Pt  On uint  With c-o ha 10-10  Sob swelling janes lower ext x 24 hours  She was discharged  Wed  From [facility] c-s 0300 on 8/31 she tool a percocet today  Not sure dose.. sat98  b-p150/67 --146/66  Temp 98.9 resp 22 resp  Slight  wheezing sound  Hx of  Asthma ...  No inh -- used  Last  10 years left  Message  With  Answering serv

## 2019-09-06 NOTE — ANESTHESIA PROCEDURE NOTES
Epidural Blood Patch    Patient location during procedure: OB   Reason for epidural blood patch: at surgeon's request, post-op pain management, post dural puncture headache  Start time: 9/6/2019 4:45 PM  Timeout: 9/6/2019 4:45 PM  End time: 9/6/2019 4:55 PM     Staffing  Authorizing Provider: Serge Almaguer MD  Performing Provider: Serge Almaguer MD    Preanesthetic Checklist  Completed: patient identified, site marked, surgical consent, pre-op evaluation, timeout performed, IV checked, risks and benefits discussed, monitors and equipment checked, anesthesia consent given, hand hygiene performed and patient being monitored  Preparation  Patient position: sitting  Prep: ChloraPrep  Patient monitoring: Blood Pressure and Pulse Ox  Epidural Blood Patch  Skin Anesthetic: lidocaine 1%  Approach: midline  Interspace: L3-4  Blood was aseptically collected.  Collection Location: venous    Needle and Epidural Catheter  Needle type: Tuohy   Needle gauge: 17  Additional Documentation: incremental injection, negative aspiration for heme and CSF, no paresthesia on injection, no significant complaints from patient, no significant pain on injection and no signs/symptoms of IV or SA injection  Needle localization: anatomical landmarks  Assessment  Ease of injection: easy  Patient's tolerance of the procedure: comfortable throughoutAmount of blood injected: 12mL.

## 2019-09-07 VITALS
WEIGHT: 274 LBS | HEART RATE: 70 BPM | HEIGHT: 65 IN | TEMPERATURE: 99 F | RESPIRATION RATE: 18 BRPM | BODY MASS INDEX: 45.65 KG/M2 | DIASTOLIC BLOOD PRESSURE: 64 MMHG | SYSTOLIC BLOOD PRESSURE: 158 MMHG | OXYGEN SATURATION: 95 %

## 2019-09-07 LAB
BACTERIA UR CULT: NORMAL
BACTERIA UR CULT: NORMAL

## 2019-09-07 PROCEDURE — 63600175 PHARM REV CODE 636 W HCPCS: Performed by: SPECIALIST

## 2019-09-07 PROCEDURE — 25000003 PHARM REV CODE 250: Performed by: SPECIALIST

## 2019-09-07 RX ADMIN — DEXTROSE 2 G: 50 INJECTION, SOLUTION INTRAVENOUS at 10:09

## 2019-09-07 RX ADMIN — ACETAMINOPHEN 1000 MG: 500 TABLET ORAL at 11:09

## 2019-09-07 RX ADMIN — DEXTROSE 2 G: 50 INJECTION, SOLUTION INTRAVENOUS at 03:09

## 2019-09-07 NOTE — PROGRESS NOTES
Pt presented to L&D yesterday with headache. She is s/p delivery 19 via  with spinal. Underwent  Epidural blood patch yesterday for headache. Pt reports resolution of headache and backpain. Feels well with no complaint. Afebrile, VSS      Aisha Lamar MD  [employer] [facility], PGY3

## 2019-09-07 NOTE — PLAN OF CARE
Dr chou on unit and saw pt. Discharge orders received. Pt in NICU visiting babies. Will proceed with discharge when pt back in room.

## 2019-09-07 NOTE — PLAN OF CARE
Pt resting comfortably. States she's feeling better. No headache. Afebrile. poc discussed. Call bell within reach. Will continue to monitor.

## 2019-09-07 NOTE — NURSING
1905  Dr Marquez  In room pt sitting up feeling  Better  After blood patch   When all  Labs come back   May be able to be discharge

## 2019-09-07 NOTE — NURSING
Care assumed. Pt aaox3, no c/o, nad. No iv site. Pt had a blood patch for headache; reports pain is gone. bp's noted elevated, 140's over 70's.

## 2019-09-07 NOTE — NURSING
Iv started left ac. 100mg po tylenol/ lr infusion/ ancef 2gm ivpb started.pt reports having a headache again.

## 2019-09-07 NOTE — PROGRESS NOTES
HD#2  Feels so much better today, no headache post blood patch  VSS  Had one elevated temperature last night, now afebrile  gen- no distress   abd- soft  Less edema,   Ext 1+ edema  IMP  Post section, with spinal headache now much better post blood patch  One elevated temp, now afebrile  PLAN  Discharge to home

## 2019-09-07 NOTE — NURSING
Late  Entry 1531 back to room from [facility]  Dr paz  Will go in to see pt 1710 pt  Laying flat per  Dr paz she is aware to lay flat next 30 minutes she  States  Her ha  Getting better   4-10..family at bedside  All bellinreach

## 2019-09-07 NOTE — PLAN OF CARE
Problem: Adult Inpatient Plan of Care  Goal: Plan of Care Review  Outcome: Outcome(s) achieved Date Met: 19  Mom will continue to pump/hand express at least 8+ times/24 hrs for  twins. Has [employer] Yours at bs. Encouraged to use Symphony pump when visiting babies. Symphony pump in NICU at babies' bs. Will collect, label, store & transport EBM as instructed. Will call for any needs.

## 2019-09-07 NOTE — NURSING
Temp now 101.0. Dr chou notified. Will keep pt overnight, give ancef, tylenol and ivf's. Pt reports no headache now.

## 2019-09-09 NOTE — DISCHARGE SUMMARY
DATE OF ADMISSION:  2019.    DATE OF DISCHARGE:  2019.    HOSPITAL COURSE:  Ms. Norton is a 28-year-old patient of Dr. Devine.  She is   status post  for twins 1 week prior to admit.  She was admitted   complaining of headache, feeling bad and some weakness and shortness of breath.    During her pregnancy and during her postpartum course, she had no evidence of   preeclampsia or pulmonary problems.    Ms. Norton was admitted and was observed.  She had labs done, which were normal.    She was anemic, but her platelet count was normal and she had protein in her   urine, but it was bloody due to her postpartum status.    PHYSICAL EXAMINATION:  Her blood pressure was not elevated.  She did have clear   lungs with clear breath sounds, but she had significant 3+ edema and complained   of a constant headache.  She had a chest x-ray, which was normal.  She was   evaluated by Anesthesia and it was significant enough that she had a blood   patch.  After the blood patch, she almost immediately felt better.  She now has   no headache, but on the night of the blood patch, she did have an elevated   temperature.  She was begun on Ancef for three doses and she was afebrile after   that.  She is now tolerating a regular diet, ambulating without difficulty.  Her   incision is clean, dry and intact.  She will be discharged in stable condition.    DISCHARGE DATA:  Discharged to home.    DIAGNOSES:  Status post  1 week ago and spinal headache status post   blood patch, now resolved.    CONDITION:  Stable.    DIET:  Regular.    ACTIVITY:  Pelvic rest and frequent rest.    MEDICATIONS:  She received no new medications.  She was then instructed to   follow up with Dr. Devine who is her OB/GYN in 1 week and to return or call for   any problems.      [facility]  dd: 2019 23:33:49 (CDT)  td: 2019 03:37:52 (CDT)  Doc ID   #[id number]  Job ID #[id number]    CC: Corrie Devine M.D.

## 2021-07-08 ENCOUNTER — NURSE TRIAGE (OUTPATIENT)
Dept: ADMINISTRATIVE | Facility: CLINIC | Age: 31
End: 2021-07-08

## 2021-07-08 ENCOUNTER — PATIENT MESSAGE (OUTPATIENT)
Dept: ADMINISTRATIVE | Facility: OTHER | Age: 31
End: 2021-07-08

## 2021-07-08 ENCOUNTER — PATIENT MESSAGE (OUTPATIENT)
Dept: ADMINISTRATIVE | Facility: CLINIC | Age: 31
End: 2021-07-08

## 2021-07-09 ENCOUNTER — PATIENT MESSAGE (OUTPATIENT)
Dept: ADMINISTRATIVE | Facility: OTHER | Age: 31
End: 2021-07-09

## 2021-07-09 ENCOUNTER — NURSE TRIAGE (OUTPATIENT)
Dept: ADMINISTRATIVE | Facility: CLINIC | Age: 31
End: 2021-07-09

## 2021-07-09 ENCOUNTER — PATIENT OUTREACH (OUTPATIENT)
Dept: INFECTIOUS DISEASES | Facility: HOSPITAL | Age: 31
End: 2021-07-09

## 2021-07-10 ENCOUNTER — PATIENT MESSAGE (OUTPATIENT)
Dept: ADMINISTRATIVE | Facility: OTHER | Age: 31
End: 2021-07-10

## 2021-07-10 ENCOUNTER — NURSE TRIAGE (OUTPATIENT)
Dept: ADMINISTRATIVE | Facility: CLINIC | Age: 31
End: 2021-07-10

## 2021-07-10 ENCOUNTER — PATIENT MESSAGE (OUTPATIENT)
Dept: ADMINISTRATIVE | Facility: CLINIC | Age: 31
End: 2021-07-10

## 2021-07-11 ENCOUNTER — NURSE TRIAGE (OUTPATIENT)
Dept: ADMINISTRATIVE | Facility: CLINIC | Age: 31
End: 2021-07-11

## 2021-07-11 ENCOUNTER — PATIENT MESSAGE (OUTPATIENT)
Dept: ADMINISTRATIVE | Facility: OTHER | Age: 31
End: 2021-07-11

## 2021-07-12 ENCOUNTER — NURSE TRIAGE (OUTPATIENT)
Dept: ADMINISTRATIVE | Facility: CLINIC | Age: 31
End: 2021-07-12

## 2021-07-12 ENCOUNTER — PATIENT MESSAGE (OUTPATIENT)
Dept: ADMINISTRATIVE | Facility: CLINIC | Age: 31
End: 2021-07-12

## 2021-07-13 ENCOUNTER — NURSE TRIAGE (OUTPATIENT)
Dept: ADMINISTRATIVE | Facility: CLINIC | Age: 31
End: 2021-07-13

## 2021-07-13 ENCOUNTER — INFUSION (OUTPATIENT)
Dept: INFECTIOUS DISEASES | Facility: HOSPITAL | Age: 31
End: 2021-07-13
Attending: PHYSICIAN ASSISTANT
Payer: MEDICAID

## 2021-07-13 ENCOUNTER — PATIENT MESSAGE (OUTPATIENT)
Dept: ADMINISTRATIVE | Facility: CLINIC | Age: 31
End: 2021-07-13

## 2021-07-13 VITALS
TEMPERATURE: 99 F | RESPIRATION RATE: 16 BRPM | DIASTOLIC BLOOD PRESSURE: 57 MMHG | OXYGEN SATURATION: 98 % | SYSTOLIC BLOOD PRESSURE: 126 MMHG | HEART RATE: 65 BPM

## 2021-07-13 DIAGNOSIS — U07.1 COVID-19: Primary | ICD-10-CM

## 2021-07-13 PROCEDURE — 25000003 PHARM REV CODE 250: Performed by: INTERNAL MEDICINE

## 2021-07-13 PROCEDURE — M0243 CASIRIVI AND IMDEVI INFUSION: HCPCS | Performed by: INTERNAL MEDICINE

## 2021-07-13 PROCEDURE — 63600175 PHARM REV CODE 636 W HCPCS: Performed by: INTERNAL MEDICINE

## 2021-07-13 RX ORDER — DIPHENHYDRAMINE HYDROCHLORIDE 50 MG/ML
25 INJECTION INTRAMUSCULAR; INTRAVENOUS ONCE AS NEEDED
Status: ACTIVE | OUTPATIENT
Start: 2021-07-13 | End: 2032-12-09

## 2021-07-13 RX ORDER — ALBUTEROL SULFATE 90 UG/1
2 AEROSOL, METERED RESPIRATORY (INHALATION)
Status: ACTIVE | OUTPATIENT
Start: 2021-07-13

## 2021-07-13 RX ORDER — EPINEPHRINE 0.3 MG/.3ML
0.3 INJECTION SUBCUTANEOUS
Status: ACTIVE | OUTPATIENT
Start: 2021-07-13

## 2021-07-13 RX ORDER — ACETAMINOPHEN 325 MG/1
650 TABLET ORAL ONCE AS NEEDED
Status: ACTIVE | OUTPATIENT
Start: 2021-07-13 | End: 2032-12-09

## 2021-07-13 RX ORDER — SODIUM CHLORIDE 0.9 % (FLUSH) 0.9 %
10 SYRINGE (ML) INJECTION
Status: ACTIVE | OUTPATIENT
Start: 2021-07-13

## 2021-07-13 RX ORDER — ONDANSETRON 4 MG/1
4 TABLET, ORALLY DISINTEGRATING ORAL ONCE AS NEEDED
Status: ACTIVE | OUTPATIENT
Start: 2021-07-13 | End: 2032-12-09

## 2021-07-13 RX ADMIN — CASIRIVIMAB 600 MG: 1332 INJECTION, SOLUTION, CONCENTRATE INTRAVENOUS at 11:07

## 2021-07-14 ENCOUNTER — PATIENT MESSAGE (OUTPATIENT)
Dept: ADMINISTRATIVE | Facility: CLINIC | Age: 31
End: 2021-07-14

## 2021-07-14 ENCOUNTER — NURSE TRIAGE (OUTPATIENT)
Dept: ADMINISTRATIVE | Facility: CLINIC | Age: 31
End: 2021-07-14

## 2022-07-12 NOTE — PLAN OF CARE
Problem: Adult Inpatient Plan of Care  Goal: Plan of Care Review  Outcome: Ongoing (interventions implemented as appropriate)  POC reviewed with patient. Pt verbalized understanding. VSS. NAD noted. Pt reports pain goal met with prescribed medications per MD.  Ambulating freely in room.  Tolerating regular diet. Visits babies in NICU. Smiles appropriately at babies. Family support noted at bedside.  Remains free from falls and injury. Will continue to monitor.          clears

## 2023-07-26 DIAGNOSIS — U07.1 COVID-19 VIRUS DETECTED: ICD-10-CM

## 2023-11-08 ENCOUNTER — HOSPITAL ENCOUNTER (OUTPATIENT)
Facility: HOSPITAL | Age: 33
Discharge: HOME | End: 2023-11-08
Attending: OBSTETRICS & GYNECOLOGY | Admitting: OBSTETRICS & GYNECOLOGY
Payer: MEDICAID

## 2023-11-08 VITALS
RESPIRATION RATE: 18 BRPM | DIASTOLIC BLOOD PRESSURE: 59 MMHG | TEMPERATURE: 98 F | WEIGHT: 250 LBS | BODY MASS INDEX: 41.65 KG/M2 | HEIGHT: 65 IN | OXYGEN SATURATION: 98 % | HEART RATE: 79 BPM | SYSTOLIC BLOOD PRESSURE: 114 MMHG

## 2023-11-08 DIAGNOSIS — Z34.90 PREGNANCY: ICD-10-CM

## 2023-11-08 LAB
BILIRUB UR QL STRIP: NEGATIVE
CLARITY UR: ABNORMAL
COLOR UR: YELLOW
FIBRONECTIN FETAL SPEC QL: NEGATIVE
GLUCOSE UR QL STRIP: NEGATIVE
HGB UR QL STRIP: NEGATIVE
KETONES UR QL STRIP: NEGATIVE
LEUKOCYTE ESTERASE UR QL STRIP: NEGATIVE
NITRITE UR QL STRIP: NEGATIVE
PH UR STRIP: 7 [PH] (ref 5–8)
PROT UR QL STRIP: NEGATIVE
SP GR UR STRIP: 1.01 (ref 1–1.03)
URN SPEC COLLECT METH UR: ABNORMAL
UROBILINOGEN UR STRIP-ACNC: NEGATIVE EU/DL

## 2023-11-08 PROCEDURE — 81003 URINALYSIS AUTO W/O SCOPE: CPT | Performed by: OBSTETRICS & GYNECOLOGY

## 2023-11-08 PROCEDURE — 82731 ASSAY OF FETAL FIBRONECTIN: CPT | Performed by: OBSTETRICS & GYNECOLOGY

## 2023-11-08 PROCEDURE — 63600175 PHARM REV CODE 636 W HCPCS: Performed by: OBSTETRICS & GYNECOLOGY

## 2023-11-08 PROCEDURE — 99211 OFF/OP EST MAY X REQ PHY/QHP: CPT

## 2023-11-08 RX ORDER — KETOROLAC TROMETHAMINE 30 MG/ML
INJECTION, SOLUTION INTRAMUSCULAR; INTRAVENOUS
Status: DISCONTINUED
Start: 2023-11-08 | End: 2023-11-09 | Stop reason: HOSPADM

## 2023-11-08 RX ORDER — KETOROLAC TROMETHAMINE 30 MG/ML
30 INJECTION, SOLUTION INTRAMUSCULAR; INTRAVENOUS ONCE
Status: COMPLETED | OUTPATIENT
Start: 2023-11-08 | End: 2023-11-08

## 2023-11-08 RX ADMIN — KETOROLAC TROMETHAMINE 30 MG: 30 INJECTION, SOLUTION INTRAMUSCULAR; INTRAVENOUS at 10:11

## 2023-11-09 NOTE — DISCHARGE INSTRUCTIONS
Follow up with MD in clinic.      Follow Labor Precautions:  Call MD or return to Labor and Delivery if...    Labor (after 36 weeks)  - Painful contractions every 5 minutes for 2 hours that do not go away with 2 bottles of water, 2 tylenol, and rest.      Labor (before 36 weeks)  - More than 4 contractions in 1 hour  -First try 2 bottles of water, rest, and 2 tylenol.... If the contractions do not go away call doctors office or after hours clinic first and/or come to hospital for evaluation.     Water Breaks  - Gush or leaking of fluid from vagina, or if unsure.     Vaginal bleeding  - Bright red bleeding like a period, soaking a pad in 1 hour.    Decreased or No fetal movement  - You should feel 10 movements within two hours.  -If you are not feeling the baby move.... Drink a glass of orange juice or apple juice  - If you DO NOT feel 10 movements within two hours, please  call the MD or come to the hospital.    Unable to keep fluids or food down for more than 24 hours    Blurred vision, spots before your eyes, dizziness, headache that does not get better with Tylenol (Acetaminophen), bad swelling, chest pain, or trouble breathing.    Drink 10-12 glasses of water daily  Take medications as prescribed  Keep all follow-up appointments

## 2023-11-09 NOTE — NURSING
Patient arrived to L&D complaining of pain in back and side that is a 8 out of 10. 32 year old G 5 P 4 at 24 wks Reports some vaginal spotting earlier today that is no longer present, denies leaking fluid. Fetus: fetal heart tones 150s, positive fetal movements. Contractions none noted on strip. Abdomen soft and non-tender. Vital signs WNL. Educated on electronic fetal monitoring and assessments. Questions answered. Will update MD on call, Dr. Bush.

## (undated) DEVICE — PAD ABD COMB CELOS 8X10 STRL